# Patient Record
Sex: FEMALE | Race: WHITE | NOT HISPANIC OR LATINO | Employment: OTHER | URBAN - METROPOLITAN AREA
[De-identification: names, ages, dates, MRNs, and addresses within clinical notes are randomized per-mention and may not be internally consistent; named-entity substitution may affect disease eponyms.]

---

## 2017-07-25 ENCOUNTER — OFFICE VISIT (OUTPATIENT)
Dept: FAMILY MEDICINE CLINIC | Facility: CLINIC | Age: 77
End: 2017-07-25

## 2017-07-25 VITALS
HEART RATE: 76 BPM | WEIGHT: 114 LBS | BODY MASS INDEX: 21.52 KG/M2 | SYSTOLIC BLOOD PRESSURE: 120 MMHG | DIASTOLIC BLOOD PRESSURE: 74 MMHG | HEIGHT: 61 IN | OXYGEN SATURATION: 98 %

## 2017-07-25 DIAGNOSIS — E78.5 HYPERLIPIDEMIA, UNSPECIFIED HYPERLIPIDEMIA TYPE: Primary | ICD-10-CM

## 2017-07-25 DIAGNOSIS — R53.83 FATIGUE, UNSPECIFIED TYPE: ICD-10-CM

## 2017-07-25 DIAGNOSIS — F51.01 PRIMARY INSOMNIA: ICD-10-CM

## 2017-07-25 PROCEDURE — 99213 OFFICE O/P EST LOW 20 MIN: CPT | Performed by: INTERNAL MEDICINE

## 2017-07-25 RX ORDER — CYANOCOBALAMIN 1000 UG/ML
1000 INJECTION, SOLUTION INTRAMUSCULAR; SUBCUTANEOUS
Status: DISCONTINUED | OUTPATIENT
Start: 2017-07-25 | End: 2018-05-14

## 2017-07-25 RX ORDER — DOXEPIN HYDROCHLORIDE 6 MG/1
6 TABLET ORAL NIGHTLY
Qty: 30 TABLET | Refills: 6 | Status: SHIPPED | OUTPATIENT
Start: 2017-07-25 | End: 2017-07-28 | Stop reason: SDUPTHER

## 2017-07-25 NOTE — PROGRESS NOTES
Chief Complaint   Patient presents with   • Hyperlipidemia     WANTS LABS DRAWN      Subjective   Shalini Bertrand is a 77 y.o. female    History of Present Illness    Shalini is here earlier than her usual yearly exam.  She has not been taking her cholesterol medication.  She is struggling with inability to fall asleep at night and she is worried about her memory.  His obvious that when she doesn't sleep at night she does have some difficulty remembering.  She is concerned about her fatigue at near 78 years old but sounds to me from her social history she is quite active.    The following portions of the patient's history were reviewed and updated as appropriate: allergies, current medications, past social history and problem list    No Known Allergies   Past Medical History:   Diagnosis Date   • Anemia    • Anxiety    • Arthritis    • Bacterial infection    • Basal cell carcinoma     Right cheek   • Depression    • Hyperlipidemia    • Sinus disorder    • Skin disease    • Sleep disturbance    • Uterus problem     Lining around uterus   • Weight disorder       Past Surgical History:   Procedure Laterality Date   • LASER ABLATION      uterine ablation      Social History     Social History   • Marital status: Single     Spouse name: N/A   • Number of children: N/A   • Years of education: N/A     Occupational History   • Not on file.     Social History Main Topics   • Smoking status: Former Smoker   • Smokeless tobacco: Never Used   • Alcohol use 0.6 oz/week     1 Glasses of wine per week      Comment: 2 TO 3 X A WEEK   • Drug use: No   • Sexual activity: Defer     Other Topics Concern   • Not on file     Social History Narrative      Current Outpatient Prescriptions on File Prior to Visit   Medication Sig Dispense Refill   • FLUoxetine (PROzac) 40 MG capsule Take 40 mg by mouth Daily.     • [DISCONTINUED] amitriptyline (ELAVIL) 25 MG tablet Take 25 mg by mouth Every Night.     • [DISCONTINUED] pravastatin (PRAVACHOL) 40  MG tablet Take 40 mg by mouth Daily.       No current facility-administered medications on file prior to visit.         Review of Systems   Constitutional: Negative for appetite change and fatigue.   HENT: Negative for ear pain and sore throat.    Eyes: Negative for itching and visual disturbance.   Respiratory: Negative for cough and shortness of breath.    Cardiovascular: Negative for chest pain and palpitations.   Gastrointestinal: Negative for abdominal pain and nausea.   Endocrine: Negative for cold intolerance and heat intolerance.   Genitourinary: Negative for dysuria and hematuria.   Musculoskeletal: Negative for arthralgias and back pain.   Skin: Negative for rash and wound.   Allergic/Immunologic: Negative for environmental allergies and food allergies.   Neurological: Negative for dizziness and headaches.   Hematological: Negative for adenopathy. Does not bruise/bleed easily.   Psychiatric/Behavioral: Negative for sleep disturbance. The patient is not nervous/anxious.        Objective     Vitals:    07/25/17 1156   BP: 120/74   Pulse: 76   SpO2: 98%       Physical Exam   Constitutional: She is oriented to person, place, and time. She appears well-developed and well-nourished. No distress.   HENT:   Head: Normocephalic.   No Exopthalmos   Neck: No JVD present. No thyromegaly present.   Cardiovascular: Normal rate, regular rhythm, normal heart sounds and intact distal pulses.    No murmur heard.  Pulmonary/Chest: Effort normal. No respiratory distress. She has no rales. She exhibits no tenderness.   Lymphadenopathy:     She has no cervical adenopathy.   Neurological: She is alert and oriented to person, place, and time.   Skin: Skin is warm and dry. She is not diaphoretic.   Psychiatric: She has a normal mood and affect. Her behavior is normal.   Nursing note and vitals reviewed.      Assessment/Plan   Problem List Items Addressed This Visit     None      Visit Diagnoses     Hyperlipidemia, unspecified  hyperlipidemia type    -  Primary    Relevant Orders    Lipid panel    Primary insomnia        Fatigue, unspecified type        Relevant Medications    cyanocobalamin injection 1,000 mcg        She is requesting a bottle of B12 and she will find someone to inject it for her.  I told her that I do not anticipate a dramatic response.  She also needs to know where her cholesterol as having been off her pravastatin.  She hasn't follow-up physical coming later this fall.  We will try some Tylenol or 6 mg at bedtime for sleep

## 2017-07-27 ENCOUNTER — TELEPHONE (OUTPATIENT)
Dept: FAMILY MEDICINE CLINIC | Facility: CLINIC | Age: 77
End: 2017-07-27

## 2017-07-27 NOTE — TELEPHONE ENCOUNTER
CASH PAYER AND ON RX SILENOR 6 MG PATIENT WILL HAVE TO PAY $191.40, IF CHANGE TO 10 MG WILL BE $11.00. CAN WE CHANGE IT TO THIS DOSAGE. ALSO NEED A RX FOR B12 INJECTION.

## 2017-07-28 DIAGNOSIS — E78.5 HYPERLIPIDEMIA, UNSPECIFIED HYPERLIPIDEMIA TYPE: ICD-10-CM

## 2017-07-28 RX ORDER — DOXEPIN HYDROCHLORIDE 6 MG/1
10 TABLET ORAL NIGHTLY
Qty: 30 TABLET | Refills: 6 | Status: SHIPPED | OUTPATIENT
Start: 2017-07-28 | End: 2017-11-15 | Stop reason: SDUPTHER

## 2017-07-28 RX ORDER — DOXEPIN HYDROCHLORIDE 10 MG/1
10 CAPSULE ORAL NIGHTLY
Qty: 30 CAPSULE | Refills: 1 | Status: SHIPPED | OUTPATIENT
Start: 2017-07-28 | End: 2017-11-15 | Stop reason: SDUPTHER

## 2017-07-31 ENCOUNTER — TELEPHONE (OUTPATIENT)
Dept: FAMILY MEDICINE CLINIC | Facility: CLINIC | Age: 77
End: 2017-07-31

## 2017-07-31 RX ORDER — CYANOCOBALAMIN 1000 UG/ML
1000 INJECTION, SOLUTION INTRAMUSCULAR; SUBCUTANEOUS ONCE
Qty: 1 ML | Refills: 6 | Status: SHIPPED | OUTPATIENT
Start: 2017-07-31 | End: 2017-07-31

## 2017-11-03 ENCOUNTER — TELEPHONE (OUTPATIENT)
Dept: FAMILY MEDICINE CLINIC | Facility: CLINIC | Age: 77
End: 2017-11-03

## 2017-11-03 NOTE — TELEPHONE ENCOUNTER
PT NEEDS A NOTE FOR HER DOG TO FLY WITH HER ON THE PLANE. THEY WILL NOT LET HER ON THE PLANE WITHOUT A NOTE STATING THE DOG IS FOR EMOTIONAL SUPPORT SO SHE CAN FLY. SHE NEEDS THIS TODAY PLEASE. FAX TO WHO IT MAY CONCERN NUMBER [phone].                                                                                                                                                                                                                                                                                                THIS IS THE WRONG [facility] CONTRERASHOWIE PHILLIPS'E RE ENTERED THE CORRECT ONE .

## 2017-11-03 NOTE — TELEPHONE ENCOUNTER
PT NEEDS A NOTE STATING HER DOG HAS TO FLY WITH HER FOR EMOTIONAL SUPPORT.PLEASE FAX TO WHOM CONCERNED  [phone] ASAP  THIS IS THE CORRECT PT.

## 2017-11-14 ENCOUNTER — TELEPHONE (OUTPATIENT)
Dept: FAMILY MEDICINE CLINIC | Facility: CLINIC | Age: 77
End: 2017-11-14

## 2017-11-14 NOTE — TELEPHONE ENCOUNTER
PT WANTS THE BIG VIAL TO LAST FOR A YEAR OF THE CYANOCOBALAMIN, ALSO DOXEPIN 10MG 1PO NIGHTLY AND FLUOXETINE 40MG 1PO QD.[facility] [address]

## 2017-11-15 RX ORDER — DOXEPIN HYDROCHLORIDE 10 MG/1
10 CAPSULE ORAL NIGHTLY
Qty: 30 CAPSULE | Refills: 1 | Status: SHIPPED | OUTPATIENT
Start: 2017-11-15 | End: 2018-04-23 | Stop reason: SDUPTHER

## 2017-11-15 RX ORDER — CYANOCOBALAMIN 1000 UG/ML
1000 INJECTION, SOLUTION INTRAMUSCULAR; SUBCUTANEOUS ONCE
Qty: 30 ML | Refills: 0 | Status: SHIPPED | OUTPATIENT
Start: 2017-11-15 | End: 2017-11-15

## 2017-11-15 RX ORDER — FLUOXETINE HYDROCHLORIDE 40 MG/1
40 CAPSULE ORAL DAILY
Qty: 30 CAPSULE | Refills: 1 | Status: SHIPPED | OUTPATIENT
Start: 2017-11-15 | End: 2018-04-23 | Stop reason: SDUPTHER

## 2017-11-15 NOTE — TELEPHONE ENCOUNTER
E-rx  Cyanocobalamin 1000 mcg  1 cc sq q month  Disp 30ml with 1 refill  Doxepin 10 mg 1 po qd # 30  1 refill  Fluoxetine 40 mg 1 po qd # 30  1 refill   Notified patient we do not do a years supply of meds ,we will need to see her at least twice a year ..

## 2018-04-06 ENCOUNTER — TELEPHONE (OUTPATIENT)
Dept: FAMILY MEDICINE CLINIC | Facility: CLINIC | Age: 78
End: 2018-04-06

## 2018-04-06 NOTE — TELEPHONE ENCOUNTER
PT NEEDS NOTE REGARDING LIGHT WITH DOG SENT TO      [id number]  [facility]  [id number]      PATIENT WOULD LIKE NOTE ON PRESCRIPTION PAD IF POSSIBLE, EXPLAINED TO HER COULD NOT DO THIS AS LUNA WAS NO LONGER HERE AND WHEN SHE CAME BACK TO [place] SHOULD SEE ONE OF OUR PROVIDERS    PTH

## 2018-04-06 NOTE — TELEPHONE ENCOUNTER
Old letter sent.  We have not seen patient since July 2017 and has not filled any meds with us since Nov.  Seems to have a new PCP that should write letter for her.

## 2018-04-23 ENCOUNTER — TELEPHONE (OUTPATIENT)
Dept: FAMILY MEDICINE CLINIC | Facility: CLINIC | Age: 78
End: 2018-04-23

## 2018-04-23 RX ORDER — FLUOXETINE HYDROCHLORIDE 40 MG/1
40 CAPSULE ORAL DAILY
Qty: 22 CAPSULE | Refills: 0 | Status: SHIPPED | OUTPATIENT
Start: 2018-04-23 | End: 2018-05-14

## 2018-04-23 RX ORDER — DOXEPIN HYDROCHLORIDE 10 MG/1
10 CAPSULE ORAL NIGHTLY
Qty: 22 CAPSULE | Refills: 0 | Status: SHIPPED | OUTPATIENT
Start: 2018-04-23 | End: 2018-09-12 | Stop reason: SDUPTHER

## 2018-04-23 NOTE — TELEPHONE ENCOUNTER
FLUOXETINE 40 Mg, TAKES 1 PER DAY, 30 DAY SUPPLY  DOXEPIN 10 Mg, TAKE 1 PER NIGHT OR AS NEEDED 30 DAY SUPPLY    HAS AN APPT ON 5/14    IS COMPLETELY OUT      [facility]

## 2018-04-23 NOTE — TELEPHONE ENCOUNTER
E-Rx Doxepin 10 mg capsules, tale 1 capsule po qd #22, 0 refills & Fluoxetine 40 mg capsules, take 1 capsule po qd #22, 0 refills to [facility] in [facility].

## 2018-05-14 ENCOUNTER — OFFICE VISIT (OUTPATIENT)
Dept: FAMILY MEDICINE CLINIC | Facility: CLINIC | Age: 78
End: 2018-05-14

## 2018-05-14 ENCOUNTER — LAB REQUISITION (OUTPATIENT)
Dept: LAB | Facility: HOSPITAL | Age: 78
End: 2018-05-14

## 2018-05-14 VITALS
HEART RATE: 70 BPM | BODY MASS INDEX: 21.34 KG/M2 | WEIGHT: 113 LBS | SYSTOLIC BLOOD PRESSURE: 124 MMHG | HEIGHT: 61 IN | DIASTOLIC BLOOD PRESSURE: 78 MMHG | OXYGEN SATURATION: 99 %

## 2018-05-14 DIAGNOSIS — G47.9 SLEEP DISTURBANCE: ICD-10-CM

## 2018-05-14 DIAGNOSIS — F41.9 ANXIETY: Primary | ICD-10-CM

## 2018-05-14 DIAGNOSIS — C44.91 BASAL CELL CARCINOMA, UNSPECIFIED SITE: ICD-10-CM

## 2018-05-14 DIAGNOSIS — E53.8 B12 DEFICIENCY: ICD-10-CM

## 2018-05-14 DIAGNOSIS — E78.5 HYPERLIPIDEMIA, UNSPECIFIED HYPERLIPIDEMIA TYPE: ICD-10-CM

## 2018-05-14 DIAGNOSIS — Z00.00 ROUTINE GENERAL MEDICAL EXAMINATION AT A HEALTH CARE FACILITY: ICD-10-CM

## 2018-05-14 DIAGNOSIS — F32.A DEPRESSION, UNSPECIFIED DEPRESSION TYPE: ICD-10-CM

## 2018-05-14 LAB
ALBUMIN SERPL-MCNC: 4.3 G/DL (ref 3.2–4.8)
ALBUMIN/GLOB SERPL: 1.6 G/DL (ref 1.5–2.5)
ALP SERPL-CCNC: 84 U/L (ref 25–100)
ALT SERPL-CCNC: 13 U/L (ref 7–40)
AST SERPL-CCNC: 17 U/L (ref 0–33)
BILIRUB SERPL-MCNC: 0.6 MG/DL (ref 0.3–1.2)
BUN SERPL-MCNC: 17 MG/DL (ref 9–23)
BUN/CREAT SERPL: 18.9 (ref 7–25)
CALCIUM SERPL-MCNC: 9.5 MG/DL (ref 8.7–10.4)
CHLORIDE SERPL-SCNC: 104 MMOL/L (ref 99–109)
CHOLEST SERPL-MCNC: 345 MG/DL (ref 0–200)
CO2 SERPL-SCNC: 29 MMOL/L (ref 20–31)
CREAT SERPL-MCNC: 0.9 MG/DL (ref 0.6–1.3)
GFR SERPLBLD CREATININE-BSD FMLA CKD-EPI: 60 ML/MIN/1.73
GFR SERPLBLD CREATININE-BSD FMLA CKD-EPI: 73 ML/MIN/1.73
GLOBULIN SER CALC-MCNC: 2.7 GM/DL
GLUCOSE SERPL-MCNC: 106 MG/DL (ref 70–100)
HDLC SERPL-MCNC: 64 MG/DL (ref 40–60)
LDLC SERPL CALC-MCNC: 217 MG/DL (ref 0–100)
POTASSIUM SERPL-SCNC: 4.9 MMOL/L (ref 3.5–5.5)
PROT SERPL-MCNC: 7 G/DL (ref 5.7–8.2)
SODIUM SERPL-SCNC: 140 MMOL/L (ref 132–146)
TRIGL SERPL-MCNC: 322 MG/DL (ref 0–150)
VLDLC SERPL CALC-MCNC: 64.4 MG/DL

## 2018-05-14 PROCEDURE — 36415 COLL VENOUS BLD VENIPUNCTURE: CPT | Performed by: INTERNAL MEDICINE

## 2018-05-14 PROCEDURE — 99214 OFFICE O/P EST MOD 30 MIN: CPT | Performed by: INTERNAL MEDICINE

## 2018-05-14 RX ORDER — FLUOXETINE HYDROCHLORIDE 20 MG/1
CAPSULE ORAL
Qty: 90 CAPSULE | Refills: 1 | Status: SHIPPED | OUTPATIENT
Start: 2018-05-14 | End: 2018-09-12 | Stop reason: SDUPTHER

## 2018-05-14 RX ORDER — DOXEPIN HYDROCHLORIDE 6 MG/1
6 TABLET ORAL NIGHTLY PRN
Qty: 90 TABLET | Refills: 1 | Status: SHIPPED | OUTPATIENT
Start: 2018-05-14 | End: 2019-06-18

## 2018-05-14 RX ORDER — CYANOCOBALAMIN 1000 UG/ML
1000 INJECTION, SOLUTION INTRAMUSCULAR; SUBCUTANEOUS
Qty: 10 ML | Refills: 0 | Status: SHIPPED | OUTPATIENT
Start: 2018-05-14 | End: 2018-09-12 | Stop reason: SDUPTHER

## 2018-05-14 NOTE — PATIENT INSTRUCTIONS
Flueoxetine:  Take 40mg (2 capsules) every other day alternate with 20mg (1 capsule) every other day for period of 2wks.  If doing well, then drop down to 20mg (1 capsule) every day/ daily.    Doxepin:   Dropping dose from 10mg to 6mg nightly as needed  Trial melatonin.

## 2018-05-14 NOTE — PROGRESS NOTES
77F here to [facility].  Prior patient of Dr. Torre.      Current issues:   - wants ears checked  -dry skin    -wants to wean doxepin (for sleep) and fluoxetine    -asking for letter for emotional support dog - flying to [place] , stays 4-5mo at a time, daughter with ILD - provided in past by Dr. Torre.    Review of Systems   General: no fatigue, fever/chills, unintentional wt loss, malaise, night sweats  Skin: no rash, no hives, no lesions,   Eyes: no visual disturbance   Heme: no brusing, no bleeding  ENT: no hearing loss, no dizziness, no nosebleed, no hoarseness  Endocrine: , no polyuria, polyphagia, polydipsia, no heat or cold intolerance  GI: no nausea, no vomiting, no diarrhea, no constipation, no bleeding, no pain  : no dysuria, no urinary frequency,, no hematuria, or incontinence  Extremities: no edema, , no claudication  Cardiac: no chest pain, no palpitations, no orthopnea, no PND  Respiratory: no cough, no sputum, no wheezing, no sob , no hemoptysis  Neuro: no headache, no seizure,, no paresthesias or weakness  Psych: stable anxiety, no depression    Patient Active Problem List    Diagnosis   • B12 deficiency [E53.8]   • Sleep disturbance [G47.9]   • Hyperlipidemia [E78.5]   • Anxiety [F41.9]   • Depression [F32.9]   • Basal cell carcinoma [C44.91]     Overview Note:     Right cheek       Past Surgical History:   Procedure Laterality Date   • LASER ABLATION      uterine ablation     Current Outpatient Prescriptions   Medication Sig Dispense Refill   • doxepin (SINEquan) 10 MG capsule Take 1 capsule by mouth Every Night. 22 capsule 0   • FLUoxetine (PROzac) 40 MG capsule Take 1 capsule by mouth Daily. 22 capsule 0   • cyanocobalamin 1000 MCG/ML injection Inject 1 mL into the shoulder, thigh, or buttocks Every 28 (Twenty-Eight) Days. 10 mL 0   • Doxepin HCl 6 MG tablet Take 6 mg by mouth At Night As Needed (insomnia). 90 tablet 1   • FLUoxetine (PROZAC) 20 MG capsule Take 40mg daily alternating with 20mg  "daily for period of 2wks, then take 20mg daily. 90 capsule 1     No current facility-administered medications for this visit.      No Known Allergies    Social History     Social History   • Marital status: Single   • Number of children: 1     Social History Main Topics   • Smoking status: Former Smoker   • Smokeless tobacco: Never Used   • Alcohol use 0.6 oz/week     1 Glasses of wine per week      Comment: 2 TO 3 X A WEEK   • Drug use: No   • Sexual activity: Defer     Other Topics Concern   • Not on file     Social History Narrative    5/18:    Single, living with sig other, going well.    1 daughter, Almaz Guillaume, in [place]    2 gk.    Exercise: yes    Travels to [place]    Dental: utd    Eye : utd     Family History   Problem Relation Age of Onset   • Breast cancer Other    • Heart disease Other    • Hyperlipidemia Other    • Osteoporosis Other    • Diabetes Other    • Cancer Other    • Intracerebral hemorrhage Mother    • Heart attack Father    • Hypertension Father      /78   Pulse 70   Ht 154.9 cm (61\")   Wt 51.3 kg (113 lb)   SpO2 99%   BMI 21.35 kg/m²   Gen: well appearing in nad, no resp effort  Eyes: conjunctiva clear, perrl, eomi  ENT: mmm, no thyromegaly, no lymphadenopathy  CV: s1, s2 reg no m/r/g, no bruits, no jvd  No peripheral edema, pedal pulses intact  Resp:  clear b/l no w/r/r  GI:  soft nt/nd  Skin: no clubbing or cyanosis  Neuro: no focal deficits.    A/P    1. Anxiety    2. Hyperlipidemia, unspecified hyperlipidemia type    3. B12 deficiency    4. Basal cell carcinoma, unspecified site    5. Depression, unspecified depression type    6. Sleep disturbance      Chronic issues stable, b12 shot given today, ordered  Labs ordered  F/u derm - pt to schedule  Wean doxepin from 10mg to 6mg nightly as needed sleep - discussed benefits/ risks of this med - see if this dose helps, consider weaning further  Wean fluoxetine from 40mg to 20mg as written in avs, see if stable on lower " dose    F/u 6mo medicare wellness or as needed

## 2018-07-20 ENCOUNTER — TELEPHONE (OUTPATIENT)
Dept: FAMILY MEDICINE CLINIC | Facility: CLINIC | Age: 78
End: 2018-07-20

## 2018-07-20 RX ORDER — NITROFURANTOIN 25; 75 MG/1; MG/1
100 CAPSULE ORAL EVERY 12 HOURS SCHEDULED
Qty: 10 CAPSULE | Refills: 0 | Status: SHIPPED | OUTPATIENT
Start: 2018-07-20 | End: 2019-06-18

## 2018-07-20 NOTE — TELEPHONE ENCOUNTER
PER PT IS IN [place], STATED WHEN SHE URINATES SHE HAS A TINGLING AND CHILL FEELING. PT IS WONDERING IF SOMETHING CAN GET CALLED INTO THE PHARMACY TILL THE TIME SHE WILL BE BACK. PT WILL BE BACK NEXT WEEK. PLEASE CALL PT BACK TO ADVISE.

## 2018-07-20 NOTE — TELEPHONE ENCOUNTER
Spoke with patient.  Treated about a month ago with a shot, steroid, amoxicillin - took x 10d.  C/o urinary sx since then.  Frequency, sometimes some tingling.  A little chill.  Last couple of weeks.  No fever, urgency, hematuria.    [phone].  [facility] pharmacy, [address], [place]    Plan will be to push fluids and see if sx improve over 24-48hrs.  Will send over macrobid 100m gbid x 5d in case needed while away.  Advised UC evaluation of worsening sx, lack of repsonse to tx.  Pt agrees with plan

## 2018-09-11 ENCOUNTER — TELEPHONE (OUTPATIENT)
Dept: FAMILY MEDICINE CLINIC | Facility: CLINIC | Age: 78
End: 2018-09-11

## 2018-09-11 NOTE — TELEPHONE ENCOUNTER
PER PT WAS IN THE HOSPITAL THE END OF JUNE AND THEY PRESCRIBED SOME OF THESE MEDICATIONS,  PT WOULD LIKE A REFILL.  ZOCOR 40 MG      DOXEPIN 10 MG PRN 90 DAY SUPPLY     ASPRIN 81 MG 1 EVERY DAY 90 DAY SUPPLY    B12 INJECTION    [facility] [address]

## 2018-09-12 RX ORDER — FLUOXETINE HYDROCHLORIDE 20 MG/1
20 CAPSULE ORAL DAILY
Qty: 90 CAPSULE | Refills: 0 | Status: SHIPPED | OUTPATIENT
Start: 2018-09-12 | End: 2019-01-03 | Stop reason: SDUPTHER

## 2018-09-12 RX ORDER — DOXEPIN HYDROCHLORIDE 10 MG/1
10 CAPSULE ORAL NIGHTLY
Qty: 90 CAPSULE | Refills: 0 | Status: SHIPPED | OUTPATIENT
Start: 2018-09-12 | End: 2019-06-18 | Stop reason: SDUPTHER

## 2018-09-12 RX ORDER — CYANOCOBALAMIN 1000 UG/ML
1000 INJECTION, SOLUTION INTRAMUSCULAR; SUBCUTANEOUS
Qty: 10 ML | Refills: 0 | Status: SHIPPED | OUTPATIENT
Start: 2018-09-12 | End: 2019-10-02 | Stop reason: SDUPTHER

## 2018-09-12 RX ORDER — SIMVASTATIN 40 MG
40 TABLET ORAL NIGHTLY
Qty: 90 TABLET | Refills: 0 | Status: SHIPPED | OUTPATIENT
Start: 2018-09-12 | End: 2019-01-03 | Stop reason: SDUPTHER

## 2018-09-12 NOTE — TELEPHONE ENCOUNTER
Spoke to patient, she was admitted to [facility] in [place] for nausea, vomiting and dizziness. She was there for 24 hour observation.     Records requested.     [facility] #[phone].     She verified the doses of requested meds. She also requested Fluoxetine 20mg.

## 2018-09-12 NOTE — TELEPHONE ENCOUNTER
Of course. Just wanted you to be aware of the hospitalization, addition of Simvastatin and increase of Doxepin back to 10mg. She was previously instructed to wean from 10mg to 6mg.     Each escribed, 90 day with 0RF. She has an appt 11/5/18.

## 2019-01-02 ENCOUNTER — TELEPHONE (OUTPATIENT)
Dept: FAMILY MEDICINE CLINIC | Facility: CLINIC | Age: 79
End: 2019-01-02

## 2019-01-02 NOTE — TELEPHONE ENCOUNTER
SIMVASTATIN 40 MG 1 IN THE EVENING 90 DAY SUPPLY    FLUOXETINE 20 MG 1 A DAY 90 DAY SUPPLY      [facility]

## 2019-01-03 RX ORDER — SIMVASTATIN 40 MG
40 TABLET ORAL NIGHTLY
Qty: 90 TABLET | Refills: 0 | Status: SHIPPED | OUTPATIENT
Start: 2019-01-03 | End: 2019-03-27 | Stop reason: SDUPTHER

## 2019-01-03 RX ORDER — FLUOXETINE HYDROCHLORIDE 20 MG/1
20 CAPSULE ORAL DAILY
Qty: 90 CAPSULE | Refills: 0 | Status: SHIPPED | OUTPATIENT
Start: 2019-01-03 | End: 2019-03-27 | Stop reason: SDUPTHER

## 2019-03-28 RX ORDER — SIMVASTATIN 40 MG
TABLET ORAL
Qty: 90 TABLET | Refills: 0 | Status: SHIPPED | OUTPATIENT
Start: 2019-03-28 | End: 2019-06-18

## 2019-03-28 RX ORDER — FLUOXETINE HYDROCHLORIDE 20 MG/1
CAPSULE ORAL
Qty: 90 CAPSULE | Refills: 0 | Status: SHIPPED | OUTPATIENT
Start: 2019-03-28 | End: 2019-06-18 | Stop reason: SDUPTHER

## 2019-05-14 ENCOUNTER — TELEPHONE (OUTPATIENT)
Dept: FAMILY MEDICINE CLINIC | Facility: CLINIC | Age: 79
End: 2019-05-14

## 2019-05-14 NOTE — TELEPHONE ENCOUNTER
Received letter requesting emotional support psychiatric service animal.  I need to see her in the office before I can complete the paperwork.

## 2019-05-15 NOTE — TELEPHONE ENCOUNTER
PT JUST CALLED BACK AND STATED THAT SHE IS IN [place] AND SHE FLIES BACK TO [place] ON 05/25/19. SHE NEEDS THE LETTER TO GET HER DOG ON THE PLANE. SHE SAID THAT SHE ALREADY HAS A  LETTER ON FILE FROM LAST YEAR BUT THE AIRPORT REQUIRES A FORM TO BE FILLED OUT THAT SHE SAID SHE FAXED HERE A DAY OR TWO AGO.

## 2019-05-16 NOTE — TELEPHONE ENCOUNTER
I spoke to the patient regarding her request for a statement to bring her dog on the airplane. She wants a letter similar to the last letter done by Dr Torre.

## 2019-06-18 ENCOUNTER — OFFICE VISIT (OUTPATIENT)
Dept: FAMILY MEDICINE CLINIC | Facility: CLINIC | Age: 79
End: 2019-06-18

## 2019-06-18 ENCOUNTER — TELEPHONE (OUTPATIENT)
Dept: FAMILY MEDICINE CLINIC | Facility: CLINIC | Age: 79
End: 2019-06-18

## 2019-06-18 ENCOUNTER — LAB REQUISITION (OUTPATIENT)
Dept: LAB | Facility: HOSPITAL | Age: 79
End: 2019-06-18

## 2019-06-18 VITALS
BODY MASS INDEX: 19.81 KG/M2 | WEIGHT: 116 LBS | SYSTOLIC BLOOD PRESSURE: 124 MMHG | OXYGEN SATURATION: 96 % | HEIGHT: 64 IN | HEART RATE: 72 BPM | DIASTOLIC BLOOD PRESSURE: 78 MMHG

## 2019-06-18 DIAGNOSIS — M54.2 NECK PAIN: ICD-10-CM

## 2019-06-18 DIAGNOSIS — Z00.00 ROUTINE GENERAL MEDICAL EXAMINATION AT A HEALTH CARE FACILITY: ICD-10-CM

## 2019-06-18 DIAGNOSIS — F41.1 GAD (GENERALIZED ANXIETY DISORDER): ICD-10-CM

## 2019-06-18 DIAGNOSIS — N18.30 CKD (CHRONIC KIDNEY DISEASE) STAGE 3, GFR 30-59 ML/MIN (HCC): ICD-10-CM

## 2019-06-18 DIAGNOSIS — F32.5 MAJOR DEPRESSION IN COMPLETE REMISSION (HCC): ICD-10-CM

## 2019-06-18 DIAGNOSIS — R73.01 ELEVATED FASTING GLUCOSE: ICD-10-CM

## 2019-06-18 DIAGNOSIS — G47.00 INSOMNIA, UNSPECIFIED TYPE: ICD-10-CM

## 2019-06-18 DIAGNOSIS — L29.9 ITCHING: ICD-10-CM

## 2019-06-18 DIAGNOSIS — E78.2 MIXED HYPERLIPIDEMIA: Primary | ICD-10-CM

## 2019-06-18 PROCEDURE — 36415 COLL VENOUS BLD VENIPUNCTURE: CPT | Performed by: FAMILY MEDICINE

## 2019-06-18 PROCEDURE — 99214 OFFICE O/P EST MOD 30 MIN: CPT | Performed by: FAMILY MEDICINE

## 2019-06-18 RX ORDER — DOXEPIN HYDROCHLORIDE 10 MG/1
10 CAPSULE ORAL NIGHTLY PRN
Qty: 90 CAPSULE | Refills: 3 | Status: SHIPPED | OUTPATIENT
Start: 2019-06-18 | End: 2021-08-17

## 2019-06-18 RX ORDER — ATORVASTATIN CALCIUM 40 MG/1
40 TABLET, FILM COATED ORAL NIGHTLY
Qty: 90 TABLET | Refills: 3 | Status: SHIPPED | OUTPATIENT
Start: 2019-06-18 | End: 2020-10-16 | Stop reason: SDUPTHER

## 2019-06-18 RX ORDER — FLUOXETINE HYDROCHLORIDE 20 MG/1
20 CAPSULE ORAL DAILY
Qty: 90 CAPSULE | Refills: 3 | Status: SHIPPED | OUTPATIENT
Start: 2019-06-18 | End: 2020-10-16 | Stop reason: SDUPTHER

## 2019-06-18 RX ORDER — BACLOFEN 10 MG/1
5-10 TABLET ORAL NIGHTLY PRN
Qty: 14 TABLET | Refills: 0 | Status: SHIPPED | OUTPATIENT
Start: 2019-06-18 | End: 2020-05-05 | Stop reason: SDUPTHER

## 2019-06-18 NOTE — TELEPHONE ENCOUNTER
Patient forgot to mention during her appointment that her thumb locks up and is painful and she would like a referral to a hand specialist. Please call back at [phone]

## 2019-06-18 NOTE — TELEPHONE ENCOUNTER
I recommend physical therapy for her hand.  She interested in a referral?  I do not think she needs to see a hand surgeon.

## 2019-06-18 NOTE — PROGRESS NOTES
Chief Complaint   Patient presents with   • Hyperlipidemia      Transferring care from another physician    Hyperlipidemia   This is a chronic problem. Current antihyperlipidemic treatment includes statins.   Anxiety   Presents for initial visit. Symptoms include nervous/anxious behavior. Patient reports no depressed mood.     Past treatments include non-SSRI antidepressants and SSRIs.    BHASKAR-7  Over the last two weeks, how often have you been bothered by the following problems?  Feeling nervous, anxious or on edge: 1  Not being able to stop or control worryin  Worrying too much about different things: 0  Trouble Relaxin  Being so restless that it is hard to sit still: 0  Becoming easily annoyed or irritable: 1  Feeling afraid as if something awful might happen: 1  BHASKAR 7 Total Score: 4  If you checked any problems, how difficult have these problems made it for you to do your work, take care of things at home, or get along with other people: Not difficult at all      PHQ-2/PHQ-9 Depression Screening 2019   Little interest or pleasure in doing things 0   Feeling down, depressed, or hopeless 0   Total Score 0       Taking cholesterol medication for many years. No known side effects. No known heart problems or heart attack. Last 2018 might have had a mini stroke, arguing with partner over canceled flight and she got upset, ended up in hospital with mini stroke. Blood sugar was over 400, but she wasn't diabetic but was related to IVF. When she take zocor at night, scratchy at night like crazy.  She has seen dermatology in the past and they recommended Cetaphil for her itching.    Doxepin for insomnia. Intermittent use. Sometimes groggy the next morning, takes 1/2 to 1 pil if she goes several nights without sleeping. Previous medication included amitriptyline.     Taking prozac for years, tapered down from 80mg, to 40mg to now 20mg.     Neck and shoulder pain. Wants to try a muscle relaxer. Slight  headache after her fall.     Review of Systems   Musculoskeletal: Positive for neck pain and neck stiffness.   Skin:        itching   Neurological: Positive for headache.   Psychiatric/Behavioral: Positive for sleep disturbance. Negative for depressed mood. The patient is nervous/anxious.       СВЕТЛАНА Fall Risk Clinician Key Questions   Have you fallen in the past year?: Yes    Stay Idependant Patient Questions   Patient Fall Risk Assessment Score : 0        Current Outpatient Medications on File Prior to Visit   Medication Sig Dispense Refill   • cyanocobalamin 1000 MCG/ML injection Inject 1 mL into the appropriate muscle as directed by prescriber Every 28 (Twenty-Eight) Days. 10 mL 0   • [DISCONTINUED] doxepin (SINEquan) 10 MG capsule Take 1 capsule by mouth Every Night. 90 capsule 0   • [DISCONTINUED] FLUoxetine (PROzac) 20 MG capsule TAKE 1 CAPSULE BY MOUTH ONCE DAILY 90 capsule 0   • [DISCONTINUED] simvastatin (ZOCOR) 40 MG tablet TAKE 1 TABLET BY MOUTH ONCE DAILY AT NIGHT 90 tablet 0   • [DISCONTINUED] aspirin 81 MG tablet Take 1 tablet by mouth Daily. 90 tablet 0   • [DISCONTINUED] Doxepin HCl 6 MG tablet Take 6 mg by mouth At Night As Needed (insomnia). 90 tablet 1   • [DISCONTINUED] nitrofurantoin, macrocrystal-monohydrate, (MACROBID) 100 MG capsule Take 1 capsule by mouth Every 12 (Twelve) Hours. 10 capsule 0     No current facility-administered medications on file prior to visit.        No Known Allergies    Past Medical History:   Diagnosis Date   • Anemia    • Anxiety    • Arthritis    • Bacterial infection    • Basal cell carcinoma     Right cheek   • Depression    • Hyperlipidemia    • Sinus disorder    • Sleep disturbance    • TIA (transient ischemic attack) 2019   • Uterus problem     Lining around uterus   • Weight disorder         Past Surgical History:   Procedure Laterality Date   • LASER ABLATION      uterine ablation        Family History   Problem Relation Age of Onset   • Heart disease Other  "   • Hyperlipidemia Other    • Osteoporosis Other    • Diabetes Other    • Intracerebral hemorrhage Mother    • Heart attack Father    • Hypertension Father    • Heart attack Brother    • Breast cancer Daughter         Social History     Socioeconomic History   • Marital status: Single     Spouse name: Not on file   • Number of children: 1   • Years of education: Not on file   • Highest education level: Not on file   Tobacco Use   • Smoking status: Former Smoker   • Smokeless tobacco: Never Used   Substance and Sexual Activity   • Alcohol use: Yes     Alcohol/week: 0.6 oz     Types: 1 Glasses of wine per week     Comment: 2 TO 3 X A WEEK   • Drug use: No   • Sexual activity: Defer   Social History Narrative    5/18:    Single, living with sig other, going well.    1 daughter, Almaz Guillaume, in [place]    2 gk.    Exercise: yes    [place]    Dental: utd    Eye : [facility]        Visit Vitals  /78 (BP Location: Left arm, Patient Position: Sitting, Cuff Size: Adult)   Pulse 72   Ht 162.6 cm (64\")   Wt 52.6 kg (116 lb)   SpO2 96%   BMI 19.91 kg/m²        Physical Exam   Constitutional: No distress.   Cardiovascular: Normal rate and regular rhythm.   No murmur heard.  Pulmonary/Chest: Effort normal and breath sounds normal.   Musculoskeletal:        Right shoulder: She exhibits decreased range of motion ( Pain increased with lateral rotation left). She exhibits no bony tenderness.   Psychiatric: She has a normal mood and affect. Her behavior is normal. Judgment and thought content normal.   Vitals reviewed.      Results for orders placed or performed in visit on 05/14/18   Lipid panel   Result Value Ref Range    Total Cholesterol 345 (H) 0 - 200 mg/dL    Triglycerides 322 (H) 0 - 150 mg/dL    HDL Cholesterol 64 (H) 40 - 60 mg/dL    VLDL Cholesterol 64.4 mg/dL    LDL Cholesterol  217 (H) 0 - 100 mg/dL   Comprehensive metabolic panel   Result Value Ref Range    Glucose 106 (H) 70 - 100 mg/dL    BUN 17 9 " - 23 mg/dL    Creatinine 0.90 0.60 - 1.30 mg/dL    eGFR Non African Am 60 (L) >60 mL/min/1.73    eGFR African Am 73 >60 mL/min/1.73    BUN/Creatinine Ratio 18.9 7.0 - 25.0    Sodium 140 132 - 146 mmol/L    Potassium 4.9 3.5 - 5.5 mmol/L    Chloride 104 99 - 109 mmol/L    Total CO2 29.0 20.0 - 31.0 mmol/L    Calcium 9.5 8.7 - 10.4 mg/dL    Total Protein 7.0 5.7 - 8.2 g/dL    Albumin 4.30 3.20 - 4.80 g/dL    Globulin 2.7 gm/dL    A/G Ratio 1.6 1.5 - 2.5 g/dL    Total Bilirubin 0.6 0.3 - 1.2 mg/dL    Alkaline Phosphatase 84 25 - 100 U/L    AST (SGOT) 17 0 - 33 U/L    ALT (SGPT) 13 7 - 40 U/L        Shalini was seen today for hyperlipidemia.    Diagnoses and all orders for this visit:    Mixed hyperlipidemia  -     atorvastatin (LIPITOR) 40 MG tablet; Take 1 tablet by mouth Every Night.  -     Comprehensive Metabolic Panel; Future  -     Lipid Panel; Future  -     Lipid Panel  -     Comprehensive Metabolic Panel  Reviewed most recent lab results with severely elevated mixed hyperlipidemia.  Additionally patient reports an outside hospital she was admitted for mini stroke.  Recommend continuing statin therapy.  She is concerned of side effect of itching with Zocor.  Since she has had inadequately controlled lipids as well as potential side effect recommend switching to high intensity statin with atorvastatin 40 mg.   BHASKAR (generalized anxiety disorder)  -     FLUoxetine (PROzac) 20 MG capsule; Take 1 capsule by mouth Daily.  Stable.  She has done well with lower dose of Prozac 20 mg.  Patient also travels with her dog and emotional support animal.  Reprinted letter from May 2019 for her to have for her personal records.  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  Stable.  She has done well with lower dose of Prozac 20 mg.  Insomnia, unspecified type  -     doxepin (SINEquan) 10 MG capsule; Take 1 capsule by mouth At Night As Needed for  Sleep.  Patient reports intermittent doxepin use without side effects.  Neck pain  -     baclofen (LIORESAL) 10 MG tablet; Take 0.5-1 tablets by mouth At Night As Needed for Muscle Spasms.  New.  Caution patient with side effects of sedation with muscle relaxers and to try half a tablet as needed.  Short-term use only.  CKD (chronic kidney disease) stage 3, GFR 30-59 ml/min (CMS/[facility])  -     Comprehensive Metabolic Panel; Future  -     Comprehensive Metabolic Panel  Reviewed previous lab results showing moderately reduced kidney function.  Recheck today.  Elevated fasting glucose  -     Comprehensive Metabolic Panel; Future  -     Comprehensive Metabolic Panel  Patient had elevated sugar with her last labs a year ago.  Recheck today.  Itching  -     Ambulatory Referral to Dermatology  Patient request second opinion from new dermatologist.      Return in about 1 year (around 6/18/2020) for Medicare Wellness.

## 2019-06-19 LAB
ALBUMIN SERPL-MCNC: 4.2 G/DL (ref 3.5–5.2)
ALBUMIN/GLOB SERPL: 1.7 G/DL
ALP SERPL-CCNC: 83 U/L (ref 39–117)
ALT SERPL-CCNC: 8 U/L (ref 1–33)
AST SERPL-CCNC: 11 U/L (ref 1–32)
BILIRUB SERPL-MCNC: 0.6 MG/DL (ref 0.2–1.2)
BUN SERPL-MCNC: 13 MG/DL (ref 8–23)
BUN/CREAT SERPL: 16.9 (ref 7–25)
CALCIUM SERPL-MCNC: 9.3 MG/DL (ref 8.6–10.5)
CHLORIDE SERPL-SCNC: 104 MMOL/L (ref 98–107)
CHOLEST SERPL-MCNC: 219 MG/DL (ref 0–200)
CO2 SERPL-SCNC: 28.7 MMOL/L (ref 22–29)
CREAT SERPL-MCNC: 0.77 MG/DL (ref 0.57–1)
GLOBULIN SER CALC-MCNC: 2.5 GM/DL
GLUCOSE SERPL-MCNC: 109 MG/DL (ref 65–99)
HDLC SERPL-MCNC: 70 MG/DL (ref 40–60)
LDLC SERPL CALC-MCNC: 119 MG/DL (ref 0–100)
POTASSIUM SERPL-SCNC: 4.7 MMOL/L (ref 3.5–5.2)
PROT SERPL-MCNC: 6.7 G/DL (ref 6–8.5)
SODIUM SERPL-SCNC: 143 MMOL/L (ref 136–145)
TRIGL SERPL-MCNC: 151 MG/DL (ref 0–150)
VLDLC SERPL CALC-MCNC: 30.2 MG/DL

## 2019-06-19 NOTE — TELEPHONE ENCOUNTER
Pt called back. I provided her with the medical advice listed below. She also asked about her labs, she verbalized understanding and agreed

## 2019-07-30 ENCOUNTER — OFFICE VISIT (OUTPATIENT)
Dept: FAMILY MEDICINE CLINIC | Facility: CLINIC | Age: 79
End: 2019-07-30

## 2019-07-30 ENCOUNTER — HOSPITAL ENCOUNTER (OUTPATIENT)
Dept: GENERAL RADIOLOGY | Facility: HOSPITAL | Age: 79
Discharge: HOME | End: 2019-07-30
Admitting: FAMILY MEDICINE

## 2019-07-30 VITALS
HEART RATE: 91 BPM | SYSTOLIC BLOOD PRESSURE: 126 MMHG | OXYGEN SATURATION: 95 % | WEIGHT: 116.2 LBS | HEIGHT: 64 IN | BODY MASS INDEX: 19.84 KG/M2 | DIASTOLIC BLOOD PRESSURE: 74 MMHG

## 2019-07-30 DIAGNOSIS — L29.9 PRURITUS: ICD-10-CM

## 2019-07-30 DIAGNOSIS — L29.9 PRURITUS: Primary | ICD-10-CM

## 2019-07-30 PROCEDURE — 99212 OFFICE O/P EST SF 10 MIN: CPT | Performed by: FAMILY MEDICINE

## 2019-07-30 PROCEDURE — 71046 X-RAY EXAM CHEST 2 VIEWS: CPT

## 2019-07-30 NOTE — PROGRESS NOTES
Chief Complaint   Patient presents with   • Imaging Only     pt comes in today needing an order for a chest xray, states that dermatology gave her an order but she doesn't remember what she did with it.         HPI   Itching:  Saw dermatology for itching and was recommended to have CXR. Prescribed allegra 60mg twice a day, it has helped. She had been eating more ice cream, years ago was allergic to milk. Stopped eating ice cream.             Review of Systems   Respiratory: Negative for shortness of breath and wheezing.    Skin:        itching        Current Outpatient Medications on File Prior to Visit   Medication Sig Dispense Refill   • atorvastatin (LIPITOR) 40 MG tablet Take 1 tablet by mouth Every Night. 90 tablet 3   • baclofen (LIORESAL) 10 MG tablet Take 0.5-1 tablets by mouth At Night As Needed for Muscle Spasms. 14 tablet 0   • cyanocobalamin 1000 MCG/ML injection Inject 1 mL into the appropriate muscle as directed by prescriber Every 28 (Twenty-Eight) Days. 10 mL 0   • doxepin (SINEquan) 10 MG capsule Take 1 capsule by mouth At Night As Needed for Sleep. 90 capsule 3   • FLUoxetine (PROzac) 20 MG capsule Take 1 capsule by mouth Daily. 90 capsule 3     No current facility-administered medications on file prior to visit.        No Known Allergies    Past Medical History:   Diagnosis Date   • Anemia    • Anxiety    • Arthritis    • Bacterial infection    • Basal cell carcinoma     Right cheek   • Depression    • Hyperlipidemia    • Sinus disorder    • Sleep disturbance    • TIA (transient ischemic attack) 2019   • Uterus problem     Lining around uterus   • Weight disorder         Past Surgical History:   Procedure Laterality Date   • LASER ABLATION      uterine ablation        Family History   Problem Relation Age of Onset   • Heart disease Other    • Hyperlipidemia Other    • Osteoporosis Other    • Diabetes Other    • Intracerebral hemorrhage Mother    • Heart attack Father    • Hypertension Father    •  "Heart attack Brother    • Breast cancer Daughter         Social History     Socioeconomic History   • Marital status: Single     Spouse name: Not on file   • Number of children: 1   • Years of education: Not on file   • Highest education level: Not on file   Tobacco Use   • Smoking status: Former Smoker   • Smokeless tobacco: Never Used   Substance and Sexual Activity   • Alcohol use: Yes     Alcohol/week: 0.6 oz     Types: 1 Glasses of wine per week     Comment: 2 TO 3 X A WEEK   • Drug use: No   • Sexual activity: Defer   Social History Narrative    5/18:    Single, living with sig other, going well.    1 daughter, Almaz Guillaume, in [place]    2 gk.    Exercise: yes    Travels to [place]    Dental: utd    Eye : utd        Visit Vitals  /74 (BP Location: Left arm, Patient Position: Sitting, Cuff Size: Adult)   Pulse 91   Ht 162.6 cm (64\")   Wt 52.7 kg (116 lb 3.2 oz)   SpO2 95%   BMI 19.95 kg/m²        Physical Exam   Constitutional: No distress.   Cardiovascular: Normal rate and regular rhythm.   No murmur heard.  Pulmonary/Chest: Effort normal and breath sounds normal.   Skin: Rash noted.   Excoriations posterior neck   Psychiatric: She has a normal mood and affect.   Vitals reviewed.           Shalini was seen today for imaging only.    Diagnoses and all orders for this visit:    Pruritus  -     XR Chest PA & Lateral; Future    Reviewed dermatology records from 7/9/2019.  She was recommended to have chest x-ray and was ordered today.  Of note she is not having any respiratory complaints.  Continue Allegra as prescribed by dermatologist.    Return for Medicare Wellness.  "

## 2019-10-02 RX ORDER — CYANOCOBALAMIN 1000 UG/ML
1000 INJECTION, SOLUTION INTRAMUSCULAR; SUBCUTANEOUS
Qty: 10 ML | Refills: 1 | Status: SHIPPED | OUTPATIENT
Start: 2019-10-02 | End: 2020-10-16 | Stop reason: SDUPTHER

## 2019-10-02 NOTE — TELEPHONE ENCOUNTER
PT REQUESTING REFILL ON B12 INJECTION, LOOKS LIKE DR ROSADO WAS THE LAST PHYSICIAN TO SEND IN REFILLS AND I DIDN'T SEE THAT WE HAVE CHECKED HER B12 LATELY.     IS THIS OKAY TO SEND IN?

## 2020-05-04 DIAGNOSIS — M54.2 NECK PAIN: ICD-10-CM

## 2020-05-04 NOTE — TELEPHONE ENCOUNTER
PATIENT REQUESTING A PRESCRIPTION FOR A MUSCLE RELAXER    SEND TO:  [address] - [address] [facility] [phone] [facility] [phone] [facility]      .

## 2020-05-05 ENCOUNTER — TELEPHONE (OUTPATIENT)
Dept: FAMILY MEDICINE CLINIC | Facility: CLINIC | Age: 80
End: 2020-05-05

## 2020-05-05 DIAGNOSIS — M54.2 NECK PAIN: ICD-10-CM

## 2020-05-05 RX ORDER — BACLOFEN 10 MG/1
5-10 TABLET ORAL NIGHTLY PRN
Qty: 14 TABLET | Refills: 0 | OUTPATIENT
Start: 2020-05-05

## 2020-05-05 RX ORDER — BACLOFEN 10 MG/1
5-10 TABLET ORAL NIGHTLY PRN
Qty: 14 TABLET | Refills: 0 | Status: SHIPPED | OUTPATIENT
Start: 2020-05-05 | End: 2020-10-16 | Stop reason: SDUPTHER

## 2020-05-05 NOTE — TELEPHONE ENCOUNTER
Triamcinolone 0.1% is the strongest cream I recommend. I am not comfortable prescribing a muscle relaxer due to her age.

## 2020-05-05 NOTE — TELEPHONE ENCOUNTER
Contacted patient and advised her the Baclofen has been sent to her pharmacy but Dr. FRYE does not feel comfortable prescribing anything stronger than the triamcinolone. She stated that she does not need this at this time.

## 2020-05-05 NOTE — TELEPHONE ENCOUNTER
Pt is stuck in [place] due to the lockdown. She is unable to seek medical attention there. She states that she would like a refill for a muscle relaxer that  prescribed previously.     She also stated that she can't get in touch with her dermatologist to speak with them about her Prurigo Nodularis.    A week ago she stopped taking her ATORVASTATIN, she is thinking that it may be causing her itching, however the medication she was prescribed by her dermatologist is not working. She is using   TRIAMCINOLONE ACETONIDE CREAM 0.1%

## 2020-05-05 NOTE — TELEPHONE ENCOUNTER
Contacted the patient she stated that has been using organic baby lotion to help manage her prurigo nodarlaris and it is not working. She was previously being prescribed Triamcinolone creame by her dermatologist. Their office is now closed and she is requesting Dr. FRYE to call in an alternative to help with her itching    She is unable to complete a virtual visit because she is currently in [place]. Is there an alternative med she can recieve to help with her itching nodules?

## 2020-07-15 ENCOUNTER — TELEPHONE (OUTPATIENT)
Dept: FAMILY MEDICINE CLINIC | Facility: CLINIC | Age: 80
End: 2020-07-15

## 2020-08-26 ENCOUNTER — TELEPHONE (OUTPATIENT)
Dept: FAMILY MEDICINE CLINIC | Facility: CLINIC | Age: 80
End: 2020-08-26

## 2020-08-26 NOTE — TELEPHONE ENCOUNTER
Patient is calling stating that she has a nasal drip that is just very constant that is causing her to cough. Patient has been tested for covid-19 and it was negative. Patient has been taking allergy medicine and it not working. Please advise    [phone]

## 2020-09-11 DIAGNOSIS — F32.5 MAJOR DEPRESSION IN COMPLETE REMISSION (HCC): ICD-10-CM

## 2020-09-11 DIAGNOSIS — G47.00 INSOMNIA, UNSPECIFIED TYPE: ICD-10-CM

## 2020-09-11 DIAGNOSIS — F41.1 GAD (GENERALIZED ANXIETY DISORDER): ICD-10-CM

## 2020-09-14 RX ORDER — FLUOXETINE HYDROCHLORIDE 20 MG/1
CAPSULE ORAL
Qty: 7 CAPSULE | Refills: 0 | OUTPATIENT
Start: 2020-09-14

## 2020-09-14 RX ORDER — DOXEPIN HYDROCHLORIDE 10 MG/1
10 CAPSULE ORAL NIGHTLY PRN
Qty: 7 CAPSULE | Refills: 0 | OUTPATIENT
Start: 2020-09-14

## 2020-10-12 ENCOUNTER — TELEPHONE (OUTPATIENT)
Dept: FAMILY MEDICINE CLINIC | Facility: CLINIC | Age: 80
End: 2020-10-12

## 2020-10-12 DIAGNOSIS — E78.2 MIXED HYPERLIPIDEMIA: ICD-10-CM

## 2020-10-12 DIAGNOSIS — M54.2 NECK PAIN: ICD-10-CM

## 2020-10-12 DIAGNOSIS — F41.1 GAD (GENERALIZED ANXIETY DISORDER): ICD-10-CM

## 2020-10-12 DIAGNOSIS — F32.5 MAJOR DEPRESSION IN COMPLETE REMISSION (HCC): ICD-10-CM

## 2020-10-12 RX ORDER — ATORVASTATIN CALCIUM 40 MG/1
40 TABLET, FILM COATED ORAL NIGHTLY
Qty: 90 TABLET | Refills: 3 | Status: CANCELLED | OUTPATIENT
Start: 2020-10-12

## 2020-10-12 RX ORDER — FLUOXETINE HYDROCHLORIDE 20 MG/1
20 CAPSULE ORAL DAILY
Qty: 90 CAPSULE | Refills: 3 | Status: CANCELLED | OUTPATIENT
Start: 2020-10-12

## 2020-10-12 RX ORDER — BACLOFEN 10 MG/1
5-10 TABLET ORAL NIGHTLY PRN
Qty: 14 TABLET | Refills: 0 | Status: CANCELLED | OUTPATIENT
Start: 2020-10-12

## 2020-10-12 RX ORDER — CYANOCOBALAMIN 1000 UG/ML
1000 INJECTION, SOLUTION INTRAMUSCULAR; SUBCUTANEOUS
Qty: 10 ML | Refills: 1 | Status: CANCELLED | OUTPATIENT
Start: 2020-10-12

## 2020-10-12 NOTE — TELEPHONE ENCOUNTER
Caller: Shalini Bertrand    Relationship: Self    Best call back number: [phone]    Medication needed:   Requested Prescriptions     Pending Prescriptions Disp Refills   • FLUoxetine (PROzac) 20 MG capsule 90 capsule 3     Sig: Take 1 capsule by mouth Daily.   • atorvastatin (LIPITOR) 40 MG tablet 90 tablet 3     Sig: Take 1 tablet by mouth Every Night.   • baclofen (LIORESAL) 10 MG tablet 14 tablet 0     Sig: Take 0.5-1 tablets by mouth At Night As Needed for Muscle Spasms.   • cyanocobalamin 1000 MCG/ML injection 10 mL 1     Sig: Inject 1 mL into the appropriate muscle as directed by prescriber Every 28 (Twenty-Eight) Days.       When do you need the refill by: ASAP    What details did the patient provide when requesting the medication: HAS BEEN IN [place] SINCE COVID STARTED AND WON'T BE ABLE TO COME IN FOR APPOINTMENT. PLEASE ADVISE    Does the patient have less than a 3 day supply:  [x] Yes  [] No    What is the patient's preferred pharmacy: [address] - [address] [facility] [phone] [facility] [phone]

## 2020-10-13 NOTE — TELEPHONE ENCOUNTER
Attempted to contact patient, no answer. LVM to return call to schedule an upcoming appt     Hub can relay and schedule

## 2020-10-16 ENCOUNTER — TELEMEDICINE (OUTPATIENT)
Dept: FAMILY MEDICINE CLINIC | Facility: CLINIC | Age: 80
End: 2020-10-16

## 2020-10-16 DIAGNOSIS — G47.00 INSOMNIA, UNSPECIFIED TYPE: ICD-10-CM

## 2020-10-16 DIAGNOSIS — F41.1 GAD (GENERALIZED ANXIETY DISORDER): ICD-10-CM

## 2020-10-16 DIAGNOSIS — L29.9 PRURITUS: ICD-10-CM

## 2020-10-16 DIAGNOSIS — E53.8 B12 DEFICIENCY: ICD-10-CM

## 2020-10-16 DIAGNOSIS — M54.2 NECK PAIN: ICD-10-CM

## 2020-10-16 DIAGNOSIS — E78.2 MIXED HYPERLIPIDEMIA: Primary | ICD-10-CM

## 2020-10-16 DIAGNOSIS — F32.5 MAJOR DEPRESSION IN COMPLETE REMISSION (HCC): ICD-10-CM

## 2020-10-16 PROCEDURE — 99442 PR PHYS/QHP TELEPHONE EVALUATION 11-20 MIN: CPT | Performed by: FAMILY MEDICINE

## 2020-10-16 RX ORDER — ATORVASTATIN CALCIUM 40 MG/1
40 TABLET, FILM COATED ORAL NIGHTLY
Qty: 90 TABLET | Refills: 3 | Status: SHIPPED | OUTPATIENT
Start: 2020-10-16 | End: 2021-08-17

## 2020-10-16 RX ORDER — FLUOXETINE HYDROCHLORIDE 20 MG/1
20 CAPSULE ORAL DAILY
Qty: 90 CAPSULE | Refills: 3 | Status: SHIPPED | OUTPATIENT
Start: 2020-10-16 | End: 2021-11-03 | Stop reason: SDUPTHER

## 2020-10-16 RX ORDER — BACLOFEN 10 MG/1
10 TABLET ORAL NIGHTLY PRN
Qty: 30 TABLET | Refills: 3 | Status: SHIPPED | OUTPATIENT
Start: 2020-10-16 | End: 2021-08-18 | Stop reason: SDUPTHER

## 2020-10-16 RX ORDER — DOXEPIN HYDROCHLORIDE 10 MG/1
10 CAPSULE ORAL NIGHTLY PRN
Qty: 90 CAPSULE | Refills: 3 | Status: CANCELLED | OUTPATIENT
Start: 2020-10-16

## 2020-10-16 RX ORDER — CYANOCOBALAMIN 1000 UG/ML
1000 INJECTION, SOLUTION INTRAMUSCULAR; SUBCUTANEOUS
Qty: 10 ML | Refills: 11 | Status: SHIPPED | OUTPATIENT
Start: 2020-10-16 | End: 2021-08-17

## 2020-10-16 NOTE — PROGRESS NOTES
Telehealth telephone visit.   You have chosen to receive care through a telephone visit. Do you consent to use a telephone visit for your medical care today? Yes      Chief Complaint   Patient presents with   • Hyperlipidemia        HPI     Tries to eat a healthy diet. Stopped taking atorvastatin for a couple weeks. She decided to restart medication. She was having skin and allergy problems and concerned it was the cause. Mood is pretty good with fluoxetine. Sleep is not good. Started taking melatonin. She used to take doxepin but didn't want to get in the habit of taking it. Baclofen helps neck pain as needed when tensing up. Gives herself B12 injections. She exercises, plays golf, walks and rides a bike. Biggest problem is her skin. She has been treated with steroids but has ongoing problem. She was told to take zyrtec in morning and benadryl at night as well ointments. She is under the care of dermatology.     Advance Care Planning   ACP discussion was held with the patient during this visit. Patient has an advance directive (not in EMR), copy requested.      Review of Systems   Musculoskeletal: Negative for neck pain.   Skin:        itching   Psychiatric/Behavioral: Positive for sleep disturbance. Negative for depressed mood. The patient is not nervous/anxious.         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 90 tablet 3   • baclofen (LIORESAL) 10 MG tablet Take 0.5-1 tablets by mouth At Night As Needed for Muscle Spasms. 14 tablet 0   • cyanocobalamin 1000 MCG/ML injection Inject 1 mL into the appropriate muscle as directed by prescriber Every 28  (Twenty-Eight) Days. 10 mL 1   • doxepin (SINEquan) 10 MG capsule Take 1 capsule by mouth At Night As Needed for Sleep. 90 capsule 3   • FLUoxetine (PROzac) 20 MG capsule Take 1 capsule by mouth Daily. 90 capsule 3     No current facility-administered medications for this visit.        No Known Allergies    Past Medical History:   Diagnosis Date   • Anemia    • Anxiety    • Arthritis    • Bacterial infection    • Basal cell carcinoma     Right cheek   • Depression    • Hyperlipidemia    • Sinus disorder    • Sleep disturbance    • TIA (transient ischemic attack) 2019   • Uterus problem     Lining around uterus   • Weight disorder         Past Surgical History:   Procedure Laterality Date   • LASER ABLATION      uterine ablation   • NOSE SURGERY          Family History   Problem Relation Age of Onset   • Heart disease Other    • Hyperlipidemia Other    • Osteoporosis Other    • Diabetes Other    • Intracerebral hemorrhage Mother    • Heart attack Father    • Hypertension Father    • Heart attack Brother    • Breast cancer Daughter         Social History     Socioeconomic History   • Marital status: Single     Spouse name: Not on file   • Number of children: 1   • Years of education: Not on file   • Highest education level: Not on file   Tobacco Use   • Smoking status: Former Smoker   • Smokeless tobacco: Never Used   Substance and Sexual Activity   • Alcohol use: Yes     Alcohol/week: 1.0 standard drinks     Types: 1 Glasses of wine per week     Comment: 2 TO 3 X A WEEK   • Drug use: No   • Sexual activity: Defer   Social History Narrative    5/18:    Single, living with sig other, going well.    1 daughter, Almaz Guillaume, in [place]    2 gk.    Exercise: yes    [place]    Dental: utd    Eye : utd        There were no vitals filed for this visit.   There is no height or weight on file to calculate BMI.    Physical Exam  HENT:      Right Ear: Hearing normal.      Left Ear: Hearing normal.    Pulmonary:      Effort: No respiratory distress.   Neurological:      Mental Status: She is alert and oriented to person, place, and time.   Psychiatric:         Mood and Affect: Mood normal.         Behavior: Behavior is cooperative.         Thought Content: Thought content normal.         Judgment: Judgment normal.         Diagnoses and all orders for this visit:    1. Mixed hyperlipidemia (Primary)  -     atorvastatin (LIPITOR) 40 MG tablet; Take 1 tablet by mouth Every Night.  Dispense: 90 tablet; Refill: 3  Continue statin.  2. BHASKAR (generalized anxiety disorder)  -     FLUoxetine (PROzac) 20 MG capsule; Take 1 capsule by mouth Daily.  Dispense: 90 capsule; Refill: 3  Continue Prozac.    3. 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  Dispense: 90 capsule; Refill: 3  Continue Prozac.  4. Neck pain  -     baclofen (LIORESAL) 10 MG tablet; Take 1 tablet by mouth At Night As Needed for Muscle Spasms.  Dispense: 30 tablet; Refill: 3  Tolerating baclofen intermittent use without side effects.  5. Insomnia, unspecified type  Patient has been trying to wean off doxepin.  She has replaced it with melatonin.  She did not need a refill of doxepin at this time.  6. B12 deficiency  -     cyanocobalamin 1000 MCG/ML injection; Inject 1 mL into the appropriate muscle as directed by prescriber Every 28 (Twenty-Eight) Days.  Dispense: 10 mL; Refill: 11  Continue home B12 injection.  Other orders  -     Cancel: doxepin (SINEquan) 10 MG capsule; Take 1 capsule by mouth At Night As Needed for Sleep.  Dispense: 90 capsule; Refill: 3        Return in about 6 months (around 4/16/2021) for Medicare Wellness.    Start of visit 1:57 pm. End of visit 2:10 pm.    Dr. Henrietta Vasquez

## 2021-08-17 ENCOUNTER — OFFICE VISIT (OUTPATIENT)
Dept: FAMILY MEDICINE CLINIC | Facility: CLINIC | Age: 81
End: 2021-08-17

## 2021-08-17 VITALS
HEART RATE: 88 BPM | DIASTOLIC BLOOD PRESSURE: 84 MMHG | SYSTOLIC BLOOD PRESSURE: 120 MMHG | HEIGHT: 64 IN | OXYGEN SATURATION: 97 % | BODY MASS INDEX: 18.88 KG/M2 | WEIGHT: 110.6 LBS

## 2021-08-17 DIAGNOSIS — R42 DIZZINESS: ICD-10-CM

## 2021-08-17 DIAGNOSIS — R06.02 SHORTNESS OF BREATH: Primary | ICD-10-CM

## 2021-08-17 DIAGNOSIS — G47.9 SLEEP DISTURBANCE: ICD-10-CM

## 2021-08-17 PROCEDURE — 99214 OFFICE O/P EST MOD 30 MIN: CPT | Performed by: FAMILY MEDICINE

## 2021-08-17 RX ORDER — TRAZODONE HYDROCHLORIDE 50 MG/1
25-50 TABLET ORAL NIGHTLY
Qty: 90 TABLET | Refills: 3 | Status: SHIPPED | OUTPATIENT
Start: 2021-08-17 | End: 2022-11-16 | Stop reason: SDUPTHER

## 2021-08-17 NOTE — PROGRESS NOTES
"Chief Complaint  Dizziness (Pt states that she has been dizzy for several months. Pt states that sometimes when she turns her neck she gets dizzy. Pt states that she has quit taking her Liptor because she thought that was causing the dizziness. ), Shortness of Breath (Pt states that she has noticed that she has been more short of breath than normal. Pt states that she sometimes whenever she takes a deep breath in she has some pain in the middle of her back. ), and Insomnia (Pt states that she has been able to sleep for a while now. )    Subjective          Shalini Bertrand presents to [facility]  History of Present Illness       She noticed she has shortness of breath sometimes after playing golf walking. Able to walk in her home. Balance is off if its her ears or something else and would like wax checked. She has a lot of mercury in her teeth from fillings as a teenager and wonders if that causes dizziness. She has had a few head injuries. She has a little cough. Middle back pain when she takes a deep breath. She doesn't sleep but denies fatigue. No falls. Leaning over or getting up to walk across the room she is dizzy. She used to be ballet dancer. She had labs checked in [place]. She has tried several medications for insomnia melatonin, Tylenol PM, doxepin.     Review of Systems   Constitutional: Negative for fatigue.   Respiratory: Positive for cough and shortness of breath.    Cardiovascular: Negative for chest pain.   Musculoskeletal: Positive for back pain.   Neurological: Positive for dizziness.   Psychiatric/Behavioral: Positive for sleep disturbance.         Objective   Vital Signs:   /84   Pulse 88   Ht 162.6 cm (64.02\")   Wt 50.2 kg (110 lb 9.6 oz)   SpO2 97%   BMI 18.97 kg/m²     Physical Exam  Vitals reviewed.   Constitutional:       General: She is not in acute distress.     Appearance: She is not ill-appearing.   HENT:      Right Ear: Tympanic membrane normal.    "   Left Ear: Tympanic membrane normal.   Cardiovascular:      Rate and Rhythm: Normal rate and regular rhythm.   Pulmonary:      Effort: Pulmonary effort is normal.      Breath sounds: Normal breath sounds.   Neurological:      Mental Status: She is alert.      Comments: 3 passes in exam room and on final gait mild imbalance without falling   Psychiatric:         Mood and Affect: Mood normal.        Result Review :                 Assessment and Plan    Diagnoses and all orders for this visit:    1. Shortness of breath (Primary)  -     CBC (No Diff); Future  -     Comprehensive metabolic panel; Future  -     XR Chest PA & Lateral; Future    2. Dizziness  -     CBC (No Diff); Future  -     Comprehensive metabolic panel; Future  -     CT Head Without Contrast; Future    3. Sleep disturbance  -     traZODone (DESYREL) 50 MG tablet; Take 0.5-1 tablets by mouth Every Night.  Dispense: 90 tablet; Refill: 3        Further evaluation with labs and chest x-ray.  CT scan of the head.  Consider ENT referral for dizziness.  For sleep will start trazodone and cautioned on side effects.      Follow Up   Return if symptoms worsen or fail to improve.  Patient was given instructions and counseling regarding her condition or for health maintenance advice. Please see specific information pulled into the AVS if appropriate.     Electronically signed by Henrietta Vasquez MD, 08/18/21, 8:48 AM EDT.

## 2021-08-18 ENCOUNTER — HOSPITAL ENCOUNTER (OUTPATIENT)
Dept: GENERAL RADIOLOGY | Facility: HOSPITAL | Age: 81
Discharge: HOME | End: 2021-08-18
Admitting: FAMILY MEDICINE

## 2021-08-18 DIAGNOSIS — M54.2 NECK PAIN: ICD-10-CM

## 2021-08-18 DIAGNOSIS — R06.02 SHORTNESS OF BREATH: ICD-10-CM

## 2021-08-18 PROCEDURE — 71046 X-RAY EXAM CHEST 2 VIEWS: CPT

## 2021-08-18 RX ORDER — BACLOFEN 10 MG/1
10 TABLET ORAL NIGHTLY PRN
Qty: 30 TABLET | Refills: 1 | Status: SHIPPED | OUTPATIENT
Start: 2021-08-18 | End: 2022-06-23

## 2021-08-18 NOTE — TELEPHONE ENCOUNTER
Rx Refill Note  Requested Prescriptions     Pending Prescriptions Disp Refills   • baclofen (LIORESAL) 10 MG tablet 30 tablet 3     Sig: Take 1 tablet by mouth At Night As Needed for Muscle Spasms.      Last office visit with prescribing clinician: 8/17/2021      Next office visit with prescribing clinician: Visit date not found            Kriss Gregory MA  08/18/21, 13:17 EDT

## 2021-08-18 NOTE — TELEPHONE ENCOUNTER
Caller: Shalini Bertrand    Relationship: Self    Best call back number: [phone]    Medication needed:   Requested Prescriptions     Pending Prescriptions Disp Refills   • baclofen (LIORESAL) 10 MG tablet 30 tablet 3     Sig: Take 1 tablet by mouth At Night As Needed for Muscle Spasms.       When do you need the refill by: 08/18    What additional details did the patient provide when requesting the medication: PATIENT IS OUT OF THE MEDICATION     Does the patient have less than a 3 day supply:  [x] Yes  [] No    What is the patient's preferred pharmacy: [facility] [address] [place] [address] [phone] [facility] [phone]

## 2021-08-23 ENCOUNTER — TELEPHONE (OUTPATIENT)
Dept: FAMILY MEDICINE CLINIC | Facility: CLINIC | Age: 81
End: 2021-08-23

## 2021-08-23 DIAGNOSIS — R42 VERTIGO: Primary | ICD-10-CM

## 2021-08-23 NOTE — TELEPHONE ENCOUNTER
Called and spoke with pt. Informed her referral for Home Health has been placed and will receive a call to schedule an appointment. Pt verbalized understanding and has no further questions at this time.

## 2021-08-23 NOTE — TELEPHONE ENCOUNTER
Caller: Shalini Bertrand    Relationship: Self    Best call back number: [phone] (H)    What orders are you requesting (i.e. lab or imaging): HOME HEALTH FOR VERTIGO-     In what timeframe would the patient need to come in: ASAP     Where will you receive your lab/imaging services: IN HOME     Additional notes: PATIENT STATED SHE CALLED HER INSURANCE AND THEY STATED IF THE PROVIDER ORDERED HOME HEALTH FOR VERTIGO THEY WOULD PAY FOR IT.

## 2021-08-31 ENCOUNTER — TELEPHONE (OUTPATIENT)
Dept: FAMILY MEDICINE CLINIC | Facility: CLINIC | Age: 81
End: 2021-08-31

## 2021-08-31 NOTE — TELEPHONE ENCOUNTER
BRE WITH [facility] CALLED TO MAKE SURE THAT EDS WOULD BE FOLLOWING UP WITH THE PATIENTS HOME HEALTH.

## 2021-09-03 ENCOUNTER — TELEPHONE (OUTPATIENT)
Dept: FAMILY MEDICINE CLINIC | Facility: CLINIC | Age: 81
End: 2021-09-03

## 2021-09-03 NOTE — TELEPHONE ENCOUNTER
Sandie Damon, physical therapist with [facility] , called to get verbal order to continue service with PT.  She can be reached at [phone]

## 2021-09-07 NOTE — TELEPHONE ENCOUNTER
Contacted Sandie with  & informed her that PCP has approved physical therapy orders. Sandie verbalized understanding and did not have any additional questions at this time.

## 2021-09-12 ENCOUNTER — HOSPITAL ENCOUNTER (OUTPATIENT)
Dept: CT IMAGING | Facility: HOSPITAL | Age: 81
Discharge: HOME | End: 2021-09-12
Admitting: FAMILY MEDICINE

## 2021-09-12 DIAGNOSIS — R42 DIZZINESS: ICD-10-CM

## 2021-09-12 PROCEDURE — 70450 CT HEAD/BRAIN W/O DYE: CPT

## 2021-09-15 ENCOUNTER — TELEPHONE (OUTPATIENT)
Dept: FAMILY MEDICINE CLINIC | Facility: CLINIC | Age: 81
End: 2021-09-15

## 2021-09-15 NOTE — TELEPHONE ENCOUNTER
Left a voicemail informing the patient that we were trying to get some past medical records from Dr.Steven Haq. Patient filled out ALEXIS at last office visit,but when I faxed the ALEXIS over their office sent it back stating that there were no applicable records for the dates that were listed for [id number]. I informed the patient that it might be best that she try to reach out to them about getting the correct dates for her medical records and then we can fax over another ALEXIS. Advised the patient to call back if she needs further information or needs any assistance.   HUB MAY RELAY MESSAGE.

## 2021-09-17 ENCOUNTER — TELEPHONE (OUTPATIENT)
Dept: FAMILY MEDICINE CLINIC | Facility: CLINIC | Age: 81
End: 2021-09-17

## 2021-09-17 NOTE — TELEPHONE ENCOUNTER
Called PT she states she wants to wait a couple of days to see if it gets better before she decides.

## 2021-09-17 NOTE — TELEPHONE ENCOUNTER
Contacted patient and relayed results. She is still having dizziness and is concerned about the lining around her brain may be thinning because she gets easily startled and dizzy,     I advised her that we will follow up her as needed,

## 2021-09-17 NOTE — TELEPHONE ENCOUNTER
Caller: Shalini Bertrand    Relationship: Self    Best call back number: [phone]    Caller requesting test results: YES    What test was performed: CT SCAN    When was the test performed: 9/12/21

## 2021-09-17 NOTE — TELEPHONE ENCOUNTER
Next step in evaluation of dizziness is referral to neurology and/or ENT.  Which referral which she like to be seen first?

## 2021-11-03 DIAGNOSIS — F32.5 MAJOR DEPRESSION IN COMPLETE REMISSION (HCC): ICD-10-CM

## 2021-11-03 DIAGNOSIS — F41.1 GAD (GENERALIZED ANXIETY DISORDER): ICD-10-CM

## 2021-11-03 RX ORDER — FLUOXETINE HYDROCHLORIDE 20 MG/1
20 CAPSULE ORAL DAILY
Qty: 90 CAPSULE | Refills: 3 | Status: SHIPPED | OUTPATIENT
Start: 2021-11-03 | End: 2022-06-23

## 2021-11-03 NOTE — TELEPHONE ENCOUNTER
Rx Refill Note  Requested Prescriptions     Pending Prescriptions Disp Refills   • FLUoxetine (PROzac) 20 MG capsule 90 capsule 3     Sig: Take 1 capsule by mouth Daily.      Last office visit with prescribing clinician: 8/17/2021      Next office visit with prescribing clinician: Visit date not found            NIECY CRAIG MA  11/03/21, 10:50 EDT

## 2022-06-23 ENCOUNTER — LAB (OUTPATIENT)
Dept: LAB | Facility: HOSPITAL | Age: 82
End: 2022-06-23

## 2022-06-23 ENCOUNTER — OFFICE VISIT (OUTPATIENT)
Dept: FAMILY MEDICINE CLINIC | Facility: CLINIC | Age: 82
End: 2022-06-23

## 2022-06-23 VITALS
OXYGEN SATURATION: 98 % | HEART RATE: 87 BPM | RESPIRATION RATE: 18 BRPM | BODY MASS INDEX: 20.06 KG/M2 | SYSTOLIC BLOOD PRESSURE: 122 MMHG | TEMPERATURE: 98.2 F | DIASTOLIC BLOOD PRESSURE: 72 MMHG | HEIGHT: 62 IN | WEIGHT: 109 LBS

## 2022-06-23 DIAGNOSIS — R93.3 ABNORMAL FINDINGS ON DIAGNOSTIC IMAGING OF OTHER PARTS OF DIGESTIVE TRACT: ICD-10-CM

## 2022-06-23 DIAGNOSIS — R79.9 ABNORMAL FINDING OF BLOOD CHEMISTRY, UNSPECIFIED: ICD-10-CM

## 2022-06-23 DIAGNOSIS — R26.89 BALANCE DISORDER: ICD-10-CM

## 2022-06-23 DIAGNOSIS — R93.89 ABNORMAL FINDINGS ON DIAGNOSTIC IMAGING OF OTHER SPECIFIED BODY STRUCTURES: ICD-10-CM

## 2022-06-23 DIAGNOSIS — M89.9 DISORDER OF BONE, UNSPECIFIED: ICD-10-CM

## 2022-06-23 DIAGNOSIS — R42 DIZZINESS: ICD-10-CM

## 2022-06-23 DIAGNOSIS — R26.89 BALANCE PROBLEM: ICD-10-CM

## 2022-06-23 DIAGNOSIS — R06.02 SHORTNESS OF BREATH: ICD-10-CM

## 2022-06-23 DIAGNOSIS — L29.9 ITCHING: Primary | ICD-10-CM

## 2022-06-23 LAB
ALBUMIN SERPL-MCNC: 4.3 G/DL (ref 3.5–5.2)
ALBUMIN/GLOB SERPL: 1.4 G/DL
ALP SERPL-CCNC: 83 U/L (ref 39–117)
ALT SERPL W P-5'-P-CCNC: 10 U/L (ref 1–33)
ANION GAP SERPL CALCULATED.3IONS-SCNC: 11 MMOL/L (ref 5–15)
AST SERPL-CCNC: 16 U/L (ref 1–32)
BASOPHILS # BLD AUTO: 0.06 10*3/MM3 (ref 0–0.2)
BASOPHILS NFR BLD AUTO: 1 % (ref 0–1.5)
BILIRUB SERPL-MCNC: 0.7 MG/DL (ref 0–1.2)
BUN SERPL-MCNC: 12 MG/DL (ref 8–23)
BUN/CREAT SERPL: 12.2 (ref 7–25)
CALCIUM SPEC-SCNC: 9.7 MG/DL (ref 8.6–10.5)
CHLORIDE SERPL-SCNC: 104 MMOL/L (ref 98–107)
CHOLEST SERPL-MCNC: 344 MG/DL (ref 0–200)
CO2 SERPL-SCNC: 25 MMOL/L (ref 22–29)
CREAT SERPL-MCNC: 0.98 MG/DL (ref 0.57–1)
DEPRECATED RDW RBC AUTO: 40.8 FL (ref 37–54)
EGFRCR SERPLBLD CKD-EPI 2021: 57.4 ML/MIN/1.73
EOSINOPHIL # BLD AUTO: 0.12 10*3/MM3 (ref 0–0.4)
EOSINOPHIL NFR BLD AUTO: 2.1 % (ref 0.3–6.2)
ERYTHROCYTE [DISTWIDTH] IN BLOOD BY AUTOMATED COUNT: 12.3 % (ref 12.3–15.4)
GLOBULIN UR ELPH-MCNC: 3.1 GM/DL
GLUCOSE SERPL-MCNC: 94 MG/DL (ref 65–99)
HCT VFR BLD AUTO: 45.9 % (ref 34–46.6)
HDLC SERPL-MCNC: 77 MG/DL (ref 40–60)
HGB BLD-MCNC: 15.1 G/DL (ref 12–15.9)
IMM GRANULOCYTES # BLD AUTO: 0.01 10*3/MM3 (ref 0–0.05)
IMM GRANULOCYTES NFR BLD AUTO: 0.2 % (ref 0–0.5)
LDLC SERPL CALC-MCNC: 238 MG/DL (ref 0–100)
LDLC/HDLC SERPL: 3.06 {RATIO}
LYMPHOCYTES # BLD AUTO: 1.66 10*3/MM3 (ref 0.7–3.1)
LYMPHOCYTES NFR BLD AUTO: 28.4 % (ref 19.6–45.3)
MCH RBC QN AUTO: 29.7 PG (ref 26.6–33)
MCHC RBC AUTO-ENTMCNC: 32.9 G/DL (ref 31.5–35.7)
MCV RBC AUTO: 90.4 FL (ref 79–97)
MONOCYTES # BLD AUTO: 0.44 10*3/MM3 (ref 0.1–0.9)
MONOCYTES NFR BLD AUTO: 7.5 % (ref 5–12)
NEUTROPHILS NFR BLD AUTO: 3.56 10*3/MM3 (ref 1.7–7)
NEUTROPHILS NFR BLD AUTO: 60.8 % (ref 42.7–76)
NRBC BLD AUTO-RTO: 0 /100 WBC (ref 0–0.2)
PLATELET # BLD AUTO: 350 10*3/MM3 (ref 140–450)
PMV BLD AUTO: 10.3 FL (ref 6–12)
POTASSIUM SERPL-SCNC: 4.5 MMOL/L (ref 3.5–5.2)
PROT SERPL-MCNC: 7.4 G/DL (ref 6–8.5)
RBC # BLD AUTO: 5.08 10*6/MM3 (ref 3.77–5.28)
SODIUM SERPL-SCNC: 140 MMOL/L (ref 136–145)
TRIGL SERPL-MCNC: 155 MG/DL (ref 0–150)
TSH SERPL DL<=0.05 MIU/L-ACNC: 2.37 UIU/ML (ref 0.27–4.2)
VLDLC SERPL-MCNC: 29 MG/DL (ref 5–40)
WBC NRBC COR # BLD: 5.85 10*3/MM3 (ref 3.4–10.8)

## 2022-06-23 PROCEDURE — 84443 ASSAY THYROID STIM HORMONE: CPT

## 2022-06-23 PROCEDURE — 99214 OFFICE O/P EST MOD 30 MIN: CPT | Performed by: STUDENT IN AN ORGANIZED HEALTH CARE EDUCATION/TRAINING PROGRAM

## 2022-06-23 PROCEDURE — 80061 LIPID PANEL: CPT

## 2022-06-23 PROCEDURE — 36415 COLL VENOUS BLD VENIPUNCTURE: CPT

## 2022-06-23 PROCEDURE — 83036 HEMOGLOBIN GLYCOSYLATED A1C: CPT

## 2022-06-23 PROCEDURE — 82607 VITAMIN B-12: CPT

## 2022-06-23 PROCEDURE — 82306 VITAMIN D 25 HYDROXY: CPT

## 2022-06-23 PROCEDURE — 80053 COMPREHEN METABOLIC PANEL: CPT

## 2022-06-23 PROCEDURE — 85025 COMPLETE CBC W/AUTO DIFF WBC: CPT

## 2022-06-23 RX ORDER — HYDROXYZINE PAMOATE 25 MG/1
25 CAPSULE ORAL 3 TIMES DAILY PRN
Qty: 30 CAPSULE | Refills: 2 | Status: SHIPPED | OUTPATIENT
Start: 2022-06-23

## 2022-06-23 NOTE — PROGRESS NOTES
New Patient Office Visit      Patient Name: Shalini Bertrand  : 1940   MRN: [id number]     Chief Complaint:  Annual Exam (Est care) and Fall (10 days ago)    History of Present Illness:       fall  Says she tripped on something in the garage. No residual pain.   Before this she has had issues with being off balance. She declines a feeling that the room is spinning.   No syncope or presyncope. She used to do ballet and says for over a year now she cannot stand on one foot like she used to. She says often she will lean to one side when walking. She had a ct of her head which was normal. No palpitations or chest pain shortness of breath or fever. She has a history of b12 deficiency has not been on supplement.       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   Dermatologist is [facility] but she needs a new referral. Has a history of basal cell as well.     She is taking trazodone occasionally for sleep. No side effects.     Subjective        Past Medical History:   Diagnosis Date   • Anemia    • Anxiety    • Arthritis    • Bacterial infection    • Basal cell carcinoma     Right cheek   • Depression    • Hyperlipidemia    • Sinus disorder    • Sleep disturbance    • TIA (transient ischemic attack) 2019   • Uterus problem     Lining around uterus   • Weight disorder        Past Surgical History:   Procedure Laterality Date   • LASER ABLATION      uterine ablation   • NOSE SURGERY         Family History   Problem Relation Age of Onset   • Heart disease Other    • Hyperlipidemia Other    • Osteoporosis Other    • Diabetes Other    • Intracerebral hemorrhage Mother    • Heart attack Father    • Hypertension Father    • Heart attack Brother    • Breast cancer Daughter        Social History     Socioeconomic History   • Marital status: Single   • Number of children: 1   Tobacco Use   •  "Smoking status: Former Smoker     Packs/day: 0.25     Years: 15.00     Pack years: 3.75     Start date:      Quit date:      Years since quittin.4   • Smokeless tobacco: Never Used   Substance and Sexual Activity   • Alcohol use: Yes     Alcohol/week: 1.0 standard drink     Types: 1 Glasses of wine per week     Comment: 2 TO 3 X A WEEK   • Drug use: No   • Sexual activity: Defer          Current Outpatient Medications:   •  traZODone (DESYREL) 50 MG tablet, Take 0.5-1 tablets by mouth Every Night., Disp: 90 tablet, Rfl: 3  •  hydrOXYzine pamoate (Vistaril) 25 MG capsule, Take 1 capsule by mouth 3 (Three) Times a Day As Needed for Itching or Anxiety., Disp: 30 capsule, Rfl: 2    No Known Allergies    Objective     Physical Exam:  Vitals:    22 1321   BP: 122/72   Pulse: 87   Resp: 18   Temp: 98.2 °F (36.8 °C)   SpO2: 98%   Weight: 49.4 kg (109 lb)   Height: 157.5 cm (62\")      Body mass index is 19.94 kg/m².     Physical Exam  Constitutional:       General: She is not in acute distress.     Appearance: Normal appearance.   HENT:      Head: Normocephalic and atraumatic.   Eyes:      Extraocular Movements: Extraocular movements intact.      Comments: No nystagmus   Cardiovascular:      Rate and Rhythm: Normal rate and regular rhythm.      Heart sounds: No murmur heard.  Pulmonary:      Effort: Pulmonary effort is normal. No respiratory distress.      Breath sounds: Normal breath sounds.   Abdominal:      General: Abdomen is flat.   Musculoskeletal:         General: No swelling.      Cervical back: Normal range of motion.      Comments: Normal muscle strength and sensations upper and lower extremities   Skin:     Findings: No rash.   Neurological:      General: No focal deficit present.      Mental Status: She is alert and oriented to person, place, and time.      Cranial Nerves: No cranial nerve deficit.      Sensory: No sensory deficit.      Motor: No weakness.      Coordination: Coordination " normal.      Gait: Gait normal.      Comments: Normal finger to nose  Has imbalance on heel to toe testing.   Psychiatric:         Mood and Affect: Mood normal.              Assessment / Plan      Assessment/Plan:   Diagnoses and all orders for this visit:    1. Itching (Primary)  -     Ambulatory Referral to Dermatology    2. Balance disorder  -     CBC & Differential; Future  -     CBC (No Diff); Future  -     Lipid Panel; Future  -     Hemoglobin A1c; Future  -     TSH Rfx On Abnormal To Free T4; Future  -     Vitamin B12; Future  -     Vitamin D 25 Hydroxy; Future  -     MRI Brain Without Contrast; Future    3. Abnormal findings on diagnostic imaging of other parts of digestive tract   -     Lipid Panel; Future    4. Abnormal finding of blood chemistry, unspecified   -     Hemoglobin A1c; Future    5. Abnormal findings on diagnostic imaging of other specified body structures   -     TSH Rfx On Abnormal To Free T4; Future    6. Disorder of bone, unspecified   -     Vitamin D 25 Hydroxy; Future    7. Balance problem  -     Ambulatory Referral to Physical Therapy Evaluate and treat    Other orders  -     hydrOXYzine pamoate (Vistaril) 25 MG capsule; Take 1 capsule by mouth 3 (Three) Times a Day As Needed for Itching or Anxiety.  Dispense: 30 capsule; Refill: 2        Give vistaril for anxiety about itching. Advised it can cause drowsiness or dizziness, do not take during the day.     Refer to pt for balance issue. Will check mri of her head given history of tia and balance disorder. Ct head looked okay done last fall. Off balance feeling began before this. Check lipids. Check b12.   Neuro exam normal today aside from abnormal heel to toe testing.      Return in about 1 month (around 7/23/2022) for Medicare Wellness.       Lay Funk D.O.  [facility]

## 2022-06-24 LAB
25(OH)D3 SERPL-MCNC: 45 NG/ML (ref 30–100)
HBA1C MFR BLD: 5.9 % (ref 4.8–5.6)
VIT B12 BLD-MCNC: 428 PG/ML (ref 211–946)

## 2022-06-24 RX ORDER — ATORVASTATIN CALCIUM 20 MG/1
20 TABLET, FILM COATED ORAL DAILY
Qty: 90 TABLET | Refills: 0 | Status: SHIPPED | OUTPATIENT
Start: 2022-06-24

## 2022-06-29 ENCOUNTER — TELEPHONE (OUTPATIENT)
Dept: FAMILY MEDICINE CLINIC | Facility: CLINIC | Age: 82
End: 2022-06-29

## 2022-06-29 NOTE — TELEPHONE ENCOUNTER
Informed patient of results and doctor's recommendations. She stated she would probably not take the Lipitor as suggested.

## 2022-06-29 NOTE — TELEPHONE ENCOUNTER
Caller: Shalini Bertrand    Relationship: Self    Best call back number: [phone]    What is the best time to reach you: ANYTIME    Who are you requesting to speak with (clinical staff, provider,  specific staff member): CLINICAL STAFF    Do you know the name of the person who called: SELF    What was the call regarding: PATIENT STATES THAT SHE WOULD LIKE A CALL ABOUT HER RECENT LAB WORK.    Do you require a callback: YES

## 2022-06-30 ENCOUNTER — TELEPHONE (OUTPATIENT)
Dept: FAMILY MEDICINE CLINIC | Facility: CLINIC | Age: 82
End: 2022-06-30

## 2022-07-01 ENCOUNTER — HOSPITAL ENCOUNTER (OUTPATIENT)
Dept: MRI IMAGING | Facility: HOSPITAL | Age: 82
End: 2022-07-01

## 2022-07-05 DIAGNOSIS — R26.89 IMBALANCE: Primary | ICD-10-CM

## 2022-11-01 ENCOUNTER — TELEPHONE (OUTPATIENT)
Dept: FAMILY MEDICINE CLINIC | Facility: CLINIC | Age: 82
End: 2022-11-01

## 2022-11-01 NOTE — TELEPHONE ENCOUNTER
Attempted to contact patient, no answer. Left detailed message to notify her to contact our office to schedule new patient appointment with desired PCP    Hub can relay and schedule NP appt

## 2022-11-01 NOTE — TELEPHONE ENCOUNTER
Caller: Shalini Bertrand    Relationship: Self    Best call back number:      [phone]      What is the best time to reach you:     ANY DAY AFTER 9:00 A.M.    Who are you requesting to speak with (clinical staff, provider,  specific staff member):         What was the call regarding:     PATIENT REQUESTED A CALL BACK FROM THE  SINCE SHE WOULD LIKE TO HAVE A TRANSFER OF CARE FROM DR BRIDGETT MARTINEZ BACK TO DR VILLAR    Do you require a callback:     YES

## 2022-11-07 ENCOUNTER — OFFICE VISIT (OUTPATIENT)
Dept: FAMILY MEDICINE CLINIC | Facility: CLINIC | Age: 82
End: 2022-11-07

## 2022-11-07 VITALS
DIASTOLIC BLOOD PRESSURE: 70 MMHG | SYSTOLIC BLOOD PRESSURE: 130 MMHG | BODY MASS INDEX: 20.13 KG/M2 | WEIGHT: 109.4 LBS | HEIGHT: 62 IN | OXYGEN SATURATION: 96 % | HEART RATE: 95 BPM

## 2022-11-07 DIAGNOSIS — Z23 NEED FOR VACCINATION: ICD-10-CM

## 2022-11-07 DIAGNOSIS — R82.81 PYURIA: ICD-10-CM

## 2022-11-07 DIAGNOSIS — G47.9 SLEEP DISTURBANCE: ICD-10-CM

## 2022-11-07 DIAGNOSIS — F41.1 GAD (GENERALIZED ANXIETY DISORDER): ICD-10-CM

## 2022-11-07 DIAGNOSIS — R73.03 PREDIABETES: Primary | ICD-10-CM

## 2022-11-07 DIAGNOSIS — M25.512 ACUTE PAIN OF LEFT SHOULDER: ICD-10-CM

## 2022-11-07 LAB
BILIRUB BLD-MCNC: NEGATIVE MG/DL
CLARITY, POC: ABNORMAL
COLOR UR: YELLOW
EXPIRATION DATE: ABNORMAL
GLUCOSE UR STRIP-MCNC: NEGATIVE MG/DL
KETONES UR QL: ABNORMAL
LEUKOCYTE EST, POC: ABNORMAL
Lab: ABNORMAL
NITRITE UR-MCNC: NEGATIVE MG/ML
PH UR: 5 [PH] (ref 5–8)
PROT UR STRIP-MCNC: ABNORMAL MG/DL
RBC # UR STRIP: NEGATIVE /UL
SP GR UR: 1.03 (ref 1–1.03)
UROBILINOGEN UR QL: NORMAL

## 2022-11-07 PROCEDURE — G0008 ADMIN INFLUENZA VIRUS VAC: HCPCS | Performed by: FAMILY MEDICINE

## 2022-11-07 PROCEDURE — 81003 URINALYSIS AUTO W/O SCOPE: CPT | Performed by: FAMILY MEDICINE

## 2022-11-07 PROCEDURE — 87086 URINE CULTURE/COLONY COUNT: CPT | Performed by: FAMILY MEDICINE

## 2022-11-07 PROCEDURE — 99214 OFFICE O/P EST MOD 30 MIN: CPT | Performed by: FAMILY MEDICINE

## 2022-11-07 PROCEDURE — 90677 PCV20 VACCINE IM: CPT | Performed by: FAMILY MEDICINE

## 2022-11-07 PROCEDURE — 90662 IIV NO PRSV INCREASED AG IM: CPT | Performed by: FAMILY MEDICINE

## 2022-11-07 RX ORDER — DUPILUMAB 300 MG/2ML
INJECTION, SOLUTION SUBCUTANEOUS
COMMUNITY
Start: 2022-11-01

## 2022-11-07 NOTE — PROGRESS NOTES
"Chief Complaint  Anxiety and skin issue (Has seen dermatologist was recommended to start dupixent she just received it and has not started it yet)    Subjective        Shalini Bertrand presents to [facility]  History of Present Illness     She is a \"nervous wreck.\" She was prescribed fluoxetine.     She has pain in her left arm, flares up with raising shoulder. Ache down down her arm. No chest pain. If she is about to pass gas she has more arm pain. No chest pain on exertion or palpitations. Arm pain for 2-3 weeks.     She has a skin condition, she has big holes and scab in her skin. Picking and bleeding. She saw dermatology 6 months ago. She was prescribed dupixent, she hasn't started it yet. Next appointment with dermatology 11/16/22.     She has cut down on snacks like chips, but she eats sugar.    She has lost weight, she was up to 114 lbs and lost around 5 lbs.    She is concerned about kidney problems and has too much yeast in her body. She would like a urinalysis.     She uses trazodone for sleep.       Objective   Vital Signs:  /70   Pulse 95   Ht 157.5 cm (62.01\")   Wt 49.6 kg (109 lb 6.4 oz)   SpO2 96%   BMI 20.00 kg/m²   Estimated body mass index is 20 kg/m² as calculated from the following:    Height as of this encounter: 157.5 cm (62.01\").    Weight as of this encounter: 49.6 kg (109 lb 6.4 oz).    BMI is within normal parameters. No other follow-up for BMI required.      Physical Exam  Vitals reviewed.   Constitutional:       General: She is not in acute distress.     Appearance: She is not ill-appearing.   HENT:      Right Ear: Tympanic membrane and ear canal normal.      Left Ear: Tympanic membrane and ear canal normal.   Cardiovascular:      Rate and Rhythm: Normal rate and regular rhythm.   Pulmonary:      Effort: Pulmonary effort is normal.      Breath sounds: Normal breath sounds.   Musculoskeletal:      Left shoulder: No deformity or bony tenderness. " Decreased range of motion.   Skin:     Comments: Excoriations extensively on body.   Neurological:      Mental Status: She is alert.        Result Review :  The following data was reviewed by: Henrietta Bass MD on 11/07/2022:  TSH    TSH 6/23/22   TSH 2.370           A1C Last 3 Results    HGBA1C Last 3 Results 6/23/22   Hemoglobin A1C 5.90 (A)   (A) Abnormal value                       Immunization History   Administered Date(s) Administered   • COVID-19 (MODERNA) BIVALENT BOOSTER 18+YRS 09/26/2022   • COVID-19 (MODERNA) BOOSTER 11/08/2021   • Fluzone High-Dose 65+yrs 10/05/2021   • Tdap 01/01/2007     Results for orders placed or performed in visit on 11/07/22   POC Urinalysis Dipstick, Automated    Specimen: Urine   Result Value Ref Range    Color Yellow Yellow, Straw, Dark Yellow, [place]    Clarity, UA Slightly Cloudy (A) Clear    Specific Gravity  1.030 1.005 - 1.030    pH, Urine 5.0 5.0 - 8.0    Leukocytes Moderate (2+) (A) Negative    Nitrite, UA Negative Negative    Protein, POC Trace (A) Negative mg/dL    Glucose, UA Negative Negative mg/dL    Ketones, UA Trace (A) Negative    Urobilinogen, UA Normal Normal, 0.2 E.U./dL    Bilirubin Negative Negative    Blood, UA Negative Negative    Lot Number [id number]     Expiration Date 10/21/2023      Assessment and Plan   Diagnoses and all orders for this visit:    1. Prediabetes (Primary)  -     POC Urinalysis Dipstick, Automated  Reviewed labs as ordered by previous provider in June.  Needs low sugar diet.  2. Acute pain of left shoulder  -     XR Shoulder 2+ View Left; Future  New.  Suspect arthritis or rotator cuff.  X-ray ordered today.  Recommend physical therapy but patient prefers exercises at home and handout provided.  3. Sleep disturbance  Continue trazodone.    4. Pyuria  -     Urine Culture - Urine, Urine, Clean Catch; Future  Asymptomatic urinalysis today with white blood cells.  Check urine culture and defer antibiotics unless positive.  5. Need  for vaccination  -     Fluzone High-Dose 65+yrs  -     Pneumococcal Conjugate Vaccine 20-Valent All  6. BHASKAR (generalized anxiety disorder)  She does not have fluoxetine on her active medication list.           Follow Up   No follow-ups on file.  Patient was given instructions and counseling regarding her condition or for health maintenance advice. Please see specific information pulled into the AVS if appropriate.     Electronically signed by Henrietta Bass MD, 11/07/22, 2:37 PM EST.

## 2022-11-09 LAB — BACTERIA SPEC AEROBE CULT: NORMAL

## 2022-11-16 DIAGNOSIS — G47.9 SLEEP DISTURBANCE: ICD-10-CM

## 2022-11-16 NOTE — TELEPHONE ENCOUNTER
Caller: Shalini Bertrand    Relationship: Self    Best call back number: [phone]    Requested Prescriptions:   Requested Prescriptions     Pending Prescriptions Disp Refills   • traZODone (DESYREL) 50 MG tablet 90 tablet 3     Sig: Take 0.5-1 tablets by mouth Every Night.        Pharmacy where request should be sent: [facility] [address] [place] [address] [phone] [facility] [phone] FX     Additional details provided by patient: PATIENT STATES THAT SHE WAS SUPPOSED TO HAVE A NEW PRESCRIPTION  FOR HER NERVES. PATIENT IS REQUESTING A CALL WHEN HAS REQUEST HAVE BEEN SENT.  PLEASE ADVISE    Does the patient have less than a 3 day supply:  [x] Yes  [] No    Yun Quezada Rep   11/16/22 10:35 EST

## 2022-11-17 RX ORDER — TRAZODONE HYDROCHLORIDE 50 MG/1
25-50 TABLET ORAL NIGHTLY
Qty: 90 TABLET | Refills: 0 | Status: SHIPPED | OUTPATIENT
Start: 2022-11-17

## 2022-11-17 NOTE — TELEPHONE ENCOUNTER
PATIENT IS FOLLOWING UP ABOUT THIS REQUEST. SHE WAS INFORMED ON THE 48 HOURS AND SAID SHE WILL CALL TO CHECK IT ON Friday AROUND NOON. PLEASE MAKE SURE TO CALL HER WHEN THIS MEDICATION IS FILLED.     PHONE: [phone]

## 2024-09-19 ENCOUNTER — HOSPITAL ENCOUNTER (OUTPATIENT)
Facility: HOSPITAL | Age: 84
Setting detail: OBSERVATION
Discharge: HOME | End: 2024-09-20
Attending: EMERGENCY MEDICINE | Admitting: STUDENT IN AN ORGANIZED HEALTH CARE EDUCATION/TRAINING PROGRAM
Payer: MEDICARE

## 2024-09-19 DIAGNOSIS — R03.0 ELEVATED BLOOD PRESSURE READING WITHOUT DIAGNOSIS OF HYPERTENSION: ICD-10-CM

## 2024-09-19 DIAGNOSIS — R41.841 COGNITIVE COMMUNICATION DEFICIT: ICD-10-CM

## 2024-09-19 DIAGNOSIS — R47.01 EXPRESSIVE APHASIA: ICD-10-CM

## 2024-09-19 DIAGNOSIS — G45.9 TIA (TRANSIENT ISCHEMIC ATTACK): Primary | ICD-10-CM

## 2024-09-19 PROCEDURE — G0378 HOSPITAL OBSERVATION PER HR: HCPCS

## 2024-09-19 PROCEDURE — 93005 ELECTROCARDIOGRAM TRACING: CPT | Performed by: EMERGENCY MEDICINE

## 2024-09-19 PROCEDURE — 99285 EMERGENCY DEPT VISIT HI MDM: CPT

## 2024-09-19 PROCEDURE — 36415 COLL VENOUS BLD VENIPUNCTURE: CPT

## 2024-09-19 RX ORDER — SODIUM CHLORIDE 0.9 % (FLUSH) 0.9 %
10 SYRINGE (ML) INJECTION AS NEEDED
Status: DISCONTINUED | OUTPATIENT
Start: 2024-09-19 | End: 2024-09-20 | Stop reason: HOSPADM

## 2024-09-20 ENCOUNTER — APPOINTMENT (OUTPATIENT)
Dept: CT IMAGING | Facility: HOSPITAL | Age: 84
End: 2024-09-20
Payer: MEDICARE

## 2024-09-20 ENCOUNTER — APPOINTMENT (OUTPATIENT)
Dept: GENERAL RADIOLOGY | Facility: HOSPITAL | Age: 84
End: 2024-09-20
Payer: MEDICARE

## 2024-09-20 ENCOUNTER — APPOINTMENT (OUTPATIENT)
Dept: MRI IMAGING | Facility: HOSPITAL | Age: 84
End: 2024-09-20
Payer: MEDICARE

## 2024-09-20 ENCOUNTER — APPOINTMENT (OUTPATIENT)
Dept: CARDIOLOGY | Facility: HOSPITAL | Age: 84
End: 2024-09-20
Payer: MEDICARE

## 2024-09-20 ENCOUNTER — READMISSION MANAGEMENT (OUTPATIENT)
Dept: CALL CENTER | Facility: HOSPITAL | Age: 84
End: 2024-09-20
Payer: MEDICARE

## 2024-09-20 VITALS
WEIGHT: 106.26 LBS | SYSTOLIC BLOOD PRESSURE: 157 MMHG | DIASTOLIC BLOOD PRESSURE: 94 MMHG | RESPIRATION RATE: 16 BRPM | OXYGEN SATURATION: 92 % | HEART RATE: 75 BPM | HEIGHT: 61 IN | TEMPERATURE: 98.7 F | BODY MASS INDEX: 20.06 KG/M2

## 2024-09-20 PROBLEM — R29.90 NEUROLOGICAL SYMPTOMS: Status: ACTIVE | Noted: 2024-09-20

## 2024-09-20 LAB
ALBUMIN SERPL-MCNC: 4.1 G/DL (ref 3.5–5.2)
ALBUMIN/GLOB SERPL: 1.6 G/DL
ALP SERPL-CCNC: 82 U/L (ref 39–117)
ALT SERPL W P-5'-P-CCNC: 8 U/L (ref 1–33)
ALT SERPL W P-5'-P-CCNC: 8 U/L (ref 1–33)
AMPHET+METHAMPHET UR QL: NEGATIVE
AMPHETAMINES UR QL: NEGATIVE
ANION GAP SERPL CALCULATED.3IONS-SCNC: 10 MMOL/L (ref 5–15)
ANION GAP SERPL CALCULATED.3IONS-SCNC: 12 MMOL/L (ref 5–15)
ANION GAP SERPL CALCULATED.3IONS-SCNC: 9 MMOL/L (ref 5–15)
APTT PPP: 29.9 SECONDS (ref 22–39)
AST SERPL-CCNC: 17 U/L (ref 1–32)
AST SERPL-CCNC: 18 U/L (ref 1–32)
BACTERIA UR QL AUTO: ABNORMAL /HPF
BARBITURATES UR QL SCN: NEGATIVE
BASOPHILS # BLD AUTO: 0.07 10*3/MM3 (ref 0–0.2)
BASOPHILS # BLD AUTO: 0.07 10*3/MM3 (ref 0–0.2)
BASOPHILS NFR BLD AUTO: 1 % (ref 0–1.5)
BASOPHILS NFR BLD AUTO: 1.1 % (ref 0–1.5)
BENZODIAZ UR QL SCN: NEGATIVE
BH CV ECHO MEAS - AO MAX PG: 3.5 MMHG
BH CV ECHO MEAS - AO MEAN PG: 2 MMHG
BH CV ECHO MEAS - AO ROOT DIAM: 2.4 CM
BH CV ECHO MEAS - AO V2 MAX: 93.9 CM/SEC
BH CV ECHO MEAS - AO V2 VTI: 19.9 CM
BH CV ECHO MEAS - AVA(I,D): 2.9 CM2
BH CV ECHO MEAS - EDV(CUBED): 54.9 ML
BH CV ECHO MEAS - EDV(MOD-SP2): 31.5 ML
BH CV ECHO MEAS - EDV(MOD-SP4): 47.2 ML
BH CV ECHO MEAS - EF(MOD-BP): 70.3 %
BH CV ECHO MEAS - EF(MOD-SP2): 71.4 %
BH CV ECHO MEAS - EF(MOD-SP4): 64.4 %
BH CV ECHO MEAS - ESV(CUBED): 12.2 ML
BH CV ECHO MEAS - ESV(MOD-SP2): 9 ML
BH CV ECHO MEAS - ESV(MOD-SP4): 16.8 ML
BH CV ECHO MEAS - FS: 39.5 %
BH CV ECHO MEAS - IVS/LVPW: 1 CM
BH CV ECHO MEAS - IVSD: 0.9 CM
BH CV ECHO MEAS - LA DIMENSION: 3 CM
BH CV ECHO MEAS - LAT PEAK E' VEL: 9.1 CM/SEC
BH CV ECHO MEAS - LV MASS(C)D: 101.1 GRAMS
BH CV ECHO MEAS - LV MAX PG: 3.1 MMHG
BH CV ECHO MEAS - LV MEAN PG: 2 MMHG
BH CV ECHO MEAS - LV V1 MAX: 88.6 CM/SEC
BH CV ECHO MEAS - LV V1 VTI: 18.5 CM
BH CV ECHO MEAS - LVIDD: 3.8 CM
BH CV ECHO MEAS - LVIDS: 2.3 CM
BH CV ECHO MEAS - LVOT AREA: 3.1 CM2
BH CV ECHO MEAS - LVOT DIAM: 2 CM
BH CV ECHO MEAS - LVPWD: 0.9 CM
BH CV ECHO MEAS - MED PEAK E' VEL: 7.3 CM/SEC
BH CV ECHO MEAS - MV A MAX VEL: 120 CM/SEC
BH CV ECHO MEAS - MV DEC SLOPE: 374 CM/SEC2
BH CV ECHO MEAS - MV DEC TIME: 0.28 SEC
BH CV ECHO MEAS - MV E MAX VEL: 80.4 CM/SEC
BH CV ECHO MEAS - MV E/A: 0.67
BH CV ECHO MEAS - MV MAX PG: 8 MMHG
BH CV ECHO MEAS - MV MEAN PG: 3 MMHG
BH CV ECHO MEAS - MV P1/2T: 78.3 MSEC
BH CV ECHO MEAS - MV V2 VTI: 29.6 CM
BH CV ECHO MEAS - MVA(P1/2T): 2.8 CM2
BH CV ECHO MEAS - MVA(VTI): 1.96 CM2
BH CV ECHO MEAS - PA ACC TIME: 0.14 SEC
BH CV ECHO MEAS - RAP SYSTOLE: 3 MMHG
BH CV ECHO MEAS - RVSP: 16.1 MMHG
BH CV ECHO MEAS - SV(LVOT): 58.1 ML
BH CV ECHO MEAS - SV(MOD-SP2): 22.5 ML
BH CV ECHO MEAS - SV(MOD-SP4): 30.4 ML
BH CV ECHO MEAS - TAPSE (>1.6): 1.81 CM
BH CV ECHO MEAS - TR MAX PG: 13.1 MMHG
BH CV ECHO MEAS - TR MAX VEL: 181 CM/SEC
BH CV ECHO MEASUREMENTS AVERAGE E/E' RATIO: 9.8
BH CV XLRA - RV BASE: 2.9 CM
BH CV XLRA - RV LENGTH: 5.6 CM
BH CV XLRA - RV MID: 2.5 CM
BH CV XLRA - TDI S': 15 CM/SEC
BILIRUB SERPL-MCNC: 0.4 MG/DL (ref 0–1.2)
BILIRUB UR QL STRIP: NEGATIVE
BUN BLDA-MCNC: 17 MG/DL (ref 8–26)
BUN SERPL-MCNC: 13 MG/DL (ref 8–23)
BUN SERPL-MCNC: 17 MG/DL (ref 8–23)
BUN SERPL-MCNC: 17 MG/DL (ref 8–23)
BUN/CREAT SERPL: 13.3 (ref 7–25)
BUN/CREAT SERPL: 13.7 (ref 7–25)
BUN/CREAT SERPL: 14.9 (ref 7–25)
BUPRENORPHINE SERPL-MCNC: NEGATIVE NG/ML
CA-I BLDA-SCNC: 1.23 MMOL/L (ref 1.2–1.32)
CALCIUM SPEC-SCNC: 9.2 MG/DL (ref 8.6–10.5)
CALCIUM SPEC-SCNC: 9.3 MG/DL (ref 8.6–10.5)
CALCIUM SPEC-SCNC: 9.5 MG/DL (ref 8.6–10.5)
CANNABINOIDS SERPL QL: NEGATIVE
CHLORIDE BLDA-SCNC: 103 MMOL/L (ref 98–109)
CHLORIDE SERPL-SCNC: 104 MMOL/L (ref 98–107)
CHLORIDE SERPL-SCNC: 104 MMOL/L (ref 98–107)
CHLORIDE SERPL-SCNC: 105 MMOL/L (ref 98–107)
CHOLEST SERPL-MCNC: 273 MG/DL (ref 0–200)
CLARITY UR: CLEAR
CO2 BLDA-SCNC: 24 MMOL/L (ref 24–29)
CO2 SERPL-SCNC: 24 MMOL/L (ref 22–29)
CO2 SERPL-SCNC: 26 MMOL/L (ref 22–29)
CO2 SERPL-SCNC: 27 MMOL/L (ref 22–29)
COCAINE UR QL: NEGATIVE
COLOR UR: YELLOW
CREAT BLDA-MCNC: 1.4 MG/DL (ref 0.6–1.3)
CREAT BLDA-MCNC: 1.4 MG/DL (ref 0.6–1.3)
CREAT SERPL-MCNC: 0.95 MG/DL (ref 0.57–1)
CREAT SERPL-MCNC: 1.14 MG/DL (ref 0.57–1)
CREAT SERPL-MCNC: 1.28 MG/DL (ref 0.57–1)
CREAT UR-MCNC: 15.1 MG/DL
DEPRECATED RDW RBC AUTO: 46.5 FL (ref 37–54)
DEPRECATED RDW RBC AUTO: 46.5 FL (ref 37–54)
EGFRCR SERPLBLD CKD-EPI 2021: 36.7 ML/MIN/1.73
EGFRCR SERPLBLD CKD-EPI 2021: 40.9 ML/MIN/1.73
EGFRCR SERPLBLD CKD-EPI 2021: 47 ML/MIN/1.73
EGFRCR SERPLBLD CKD-EPI 2021: 58.5 ML/MIN/1.73
EOSINOPHIL # BLD AUTO: 0.12 10*3/MM3 (ref 0–0.4)
EOSINOPHIL # BLD AUTO: 0.16 10*3/MM3 (ref 0–0.4)
EOSINOPHIL NFR BLD AUTO: 1.9 % (ref 0.3–6.2)
EOSINOPHIL NFR BLD AUTO: 2.3 % (ref 0.3–6.2)
EOSINOPHIL SPEC QL MICRO: 0 % EOS/100 CELLS (ref 0–0)
ERYTHROCYTE [DISTWIDTH] IN BLOOD BY AUTOMATED COUNT: 13.5 % (ref 12.3–15.4)
ERYTHROCYTE [DISTWIDTH] IN BLOOD BY AUTOMATED COUNT: 13.6 % (ref 12.3–15.4)
ETHANOL BLD-MCNC: <10 MG/DL (ref 0–10)
FENTANYL UR-MCNC: NEGATIVE NG/ML
GLOBULIN UR ELPH-MCNC: 2.5 GM/DL
GLUCOSE BLDC GLUCOMTR-MCNC: 83 MG/DL (ref 70–130)
GLUCOSE BLDC GLUCOMTR-MCNC: 98 MG/DL (ref 70–130)
GLUCOSE SERPL-MCNC: 94 MG/DL (ref 65–99)
GLUCOSE SERPL-MCNC: 97 MG/DL (ref 65–99)
GLUCOSE SERPL-MCNC: 99 MG/DL (ref 65–99)
GLUCOSE UR STRIP-MCNC: NEGATIVE MG/DL
HBA1C MFR BLD: 5.7 % (ref 4.8–5.6)
HCT VFR BLD AUTO: 42.3 % (ref 34–46.6)
HCT VFR BLD AUTO: 42.4 % (ref 34–46.6)
HCT VFR BLDA CALC: 40 % (ref 38–51)
HDLC SERPL-MCNC: 67 MG/DL (ref 40–60)
HGB BLD-MCNC: 13.4 G/DL (ref 12–15.9)
HGB BLD-MCNC: 13.4 G/DL (ref 12–15.9)
HGB BLDA-MCNC: 13.6 G/DL (ref 12–17)
HGB UR QL STRIP.AUTO: NEGATIVE
HOLD SPECIMEN: NORMAL
HYALINE CASTS UR QL AUTO: ABNORMAL /LPF
IMM GRANULOCYTES # BLD AUTO: 0.01 10*3/MM3 (ref 0–0.05)
IMM GRANULOCYTES # BLD AUTO: 0.01 10*3/MM3 (ref 0–0.05)
IMM GRANULOCYTES NFR BLD AUTO: 0.1 % (ref 0–0.5)
IMM GRANULOCYTES NFR BLD AUTO: 0.2 % (ref 0–0.5)
INR PPP: 1.3 (ref 0.8–1.2)
KETONES UR QL STRIP: NEGATIVE
LDLC SERPL CALC-MCNC: 192 MG/DL (ref 0–100)
LDLC/HDLC SERPL: 2.82 {RATIO}
LEFT ATRIUM VOLUME INDEX: 8.9 ML/M2
LEUKOCYTE ESTERASE UR QL STRIP.AUTO: ABNORMAL
LYMPHOCYTES # BLD AUTO: 2.02 10*3/MM3 (ref 0.7–3.1)
LYMPHOCYTES # BLD AUTO: 2.35 10*3/MM3 (ref 0.7–3.1)
LYMPHOCYTES NFR BLD AUTO: 32.4 % (ref 19.6–45.3)
LYMPHOCYTES NFR BLD AUTO: 33.8 % (ref 19.6–45.3)
MAGNESIUM SERPL-MCNC: 2.3 MG/DL (ref 1.6–2.4)
MCH RBC QN AUTO: 29.5 PG (ref 26.6–33)
MCH RBC QN AUTO: 29.5 PG (ref 26.6–33)
MCHC RBC AUTO-ENTMCNC: 31.6 G/DL (ref 31.5–35.7)
MCHC RBC AUTO-ENTMCNC: 31.7 G/DL (ref 31.5–35.7)
MCV RBC AUTO: 93.2 FL (ref 79–97)
MCV RBC AUTO: 93.4 FL (ref 79–97)
METHADONE UR QL SCN: NEGATIVE
MONOCYTES # BLD AUTO: 0.54 10*3/MM3 (ref 0.1–0.9)
MONOCYTES # BLD AUTO: 0.64 10*3/MM3 (ref 0.1–0.9)
MONOCYTES NFR BLD AUTO: 8.7 % (ref 5–12)
MONOCYTES NFR BLD AUTO: 9.2 % (ref 5–12)
NEUTROPHILS NFR BLD AUTO: 3.48 10*3/MM3 (ref 1.7–7)
NEUTROPHILS NFR BLD AUTO: 3.73 10*3/MM3 (ref 1.7–7)
NEUTROPHILS NFR BLD AUTO: 53.6 % (ref 42.7–76)
NEUTROPHILS NFR BLD AUTO: 55.7 % (ref 42.7–76)
NITRITE UR QL STRIP: NEGATIVE
NRBC BLD AUTO-RTO: 0 /100 WBC (ref 0–0.2)
NRBC BLD AUTO-RTO: 0 /100 WBC (ref 0–0.2)
OPIATES UR QL: NEGATIVE
OXYCODONE UR QL SCN: NEGATIVE
PCP UR QL SCN: NEGATIVE
PH UR STRIP.AUTO: 7.5 [PH] (ref 5–8)
PLATELET # BLD AUTO: 280 10*3/MM3 (ref 140–450)
PLATELET # BLD AUTO: 289 10*3/MM3 (ref 140–450)
PMV BLD AUTO: 10 FL (ref 6–12)
PMV BLD AUTO: 10 FL (ref 6–12)
POTASSIUM BLDA-SCNC: 3.6 MMOL/L (ref 3.5–4.9)
POTASSIUM SERPL-SCNC: 3.8 MMOL/L (ref 3.5–5.2)
POTASSIUM SERPL-SCNC: 3.8 MMOL/L (ref 3.5–5.2)
POTASSIUM SERPL-SCNC: 4.4 MMOL/L (ref 3.5–5.2)
PROT SERPL-MCNC: 6.6 G/DL (ref 6–8.5)
PROT UR QL STRIP: NEGATIVE
PROTHROMBIN TIME: 15.7 SECONDS (ref 12.8–15.2)
QT INTERVAL: 418 MS
QTC INTERVAL: 493 MS
RBC # BLD AUTO: 4.54 10*6/MM3 (ref 3.77–5.28)
RBC # BLD AUTO: 4.54 10*6/MM3 (ref 3.77–5.28)
RBC # UR STRIP: ABNORMAL /HPF
REF LAB TEST METHOD: ABNORMAL
SODIUM BLD-SCNC: 140 MMOL/L (ref 138–146)
SODIUM SERPL-SCNC: 140 MMOL/L (ref 136–145)
SODIUM SERPL-SCNC: 140 MMOL/L (ref 136–145)
SODIUM SERPL-SCNC: 141 MMOL/L (ref 136–145)
SODIUM UR-SCNC: 106 MMOL/L
SP GR UR STRIP: 1.02 (ref 1–1.03)
SQUAMOUS #/AREA URNS HPF: ABNORMAL /HPF
TRICYCLICS UR QL SCN: NEGATIVE
TRIGL SERPL-MCNC: 85 MG/DL (ref 0–150)
TROPONIN T SERPL HS-MCNC: 10 NG/L
TSH SERPL DL<=0.05 MIU/L-ACNC: 2.29 UIU/ML (ref 0.27–4.2)
UROBILINOGEN UR QL STRIP: ABNORMAL
VLDLC SERPL-MCNC: 14 MG/DL (ref 5–40)
WBC # UR STRIP: ABNORMAL /HPF
WBC NRBC COR # BLD AUTO: 6.24 10*3/MM3 (ref 3.4–10.8)
WBC NRBC COR # BLD AUTO: 6.96 10*3/MM3 (ref 3.4–10.8)
WHOLE BLOOD HOLD COAG: NORMAL
WHOLE BLOOD HOLD SPECIMEN: NORMAL

## 2024-09-20 PROCEDURE — 71045 X-RAY EXAM CHEST 1 VIEW: CPT

## 2024-09-20 PROCEDURE — 70498 CT ANGIOGRAPHY NECK: CPT

## 2024-09-20 PROCEDURE — 84484 ASSAY OF TROPONIN QUANT: CPT | Performed by: EMERGENCY MEDICINE

## 2024-09-20 PROCEDURE — G0378 HOSPITAL OBSERVATION PER HR: HCPCS

## 2024-09-20 PROCEDURE — 93306 TTE W/DOPPLER COMPLETE: CPT | Performed by: INTERNAL MEDICINE

## 2024-09-20 PROCEDURE — 83735 ASSAY OF MAGNESIUM: CPT | Performed by: NURSE PRACTITIONER

## 2024-09-20 PROCEDURE — 99214 OFFICE O/P EST MOD 30 MIN: CPT

## 2024-09-20 PROCEDURE — 70496 CT ANGIOGRAPHY HEAD: CPT

## 2024-09-20 PROCEDURE — 80053 COMPREHEN METABOLIC PANEL: CPT | Performed by: EMERGENCY MEDICINE

## 2024-09-20 PROCEDURE — 85610 PROTHROMBIN TIME: CPT

## 2024-09-20 PROCEDURE — 99233 SBSQ HOSP IP/OBS HIGH 50: CPT | Performed by: STUDENT IN AN ORGANIZED HEALTH CARE EDUCATION/TRAINING PROGRAM

## 2024-09-20 PROCEDURE — 80047 BASIC METABLC PNL IONIZED CA: CPT

## 2024-09-20 PROCEDURE — 82570 ASSAY OF URINE CREATININE: CPT | Performed by: NURSE PRACTITIONER

## 2024-09-20 PROCEDURE — 84443 ASSAY THYROID STIM HORMONE: CPT | Performed by: NURSE PRACTITIONER

## 2024-09-20 PROCEDURE — 85025 COMPLETE CBC W/AUTO DIFF WBC: CPT | Performed by: EMERGENCY MEDICINE

## 2024-09-20 PROCEDURE — 70551 MRI BRAIN STEM W/O DYE: CPT

## 2024-09-20 PROCEDURE — 93306 TTE W/DOPPLER COMPLETE: CPT

## 2024-09-20 PROCEDURE — 82948 REAGENT STRIP/BLOOD GLUCOSE: CPT

## 2024-09-20 PROCEDURE — 80307 DRUG TEST PRSMV CHEM ANLYZR: CPT | Performed by: EMERGENCY MEDICINE

## 2024-09-20 PROCEDURE — 0042T HC CT CEREBRAL PERFUSION W/WO CONTRAST: CPT

## 2024-09-20 PROCEDURE — 85730 THROMBOPLASTIN TIME PARTIAL: CPT | Performed by: EMERGENCY MEDICINE

## 2024-09-20 PROCEDURE — 70450 CT HEAD/BRAIN W/O DYE: CPT

## 2024-09-20 PROCEDURE — 85014 HEMATOCRIT: CPT

## 2024-09-20 PROCEDURE — 92523 SPEECH SOUND LANG COMPREHEN: CPT

## 2024-09-20 PROCEDURE — 25810000003 SODIUM CHLORIDE 0.9 % SOLUTION: Performed by: NURSE PRACTITIONER

## 2024-09-20 PROCEDURE — 80061 LIPID PANEL: CPT

## 2024-09-20 PROCEDURE — 84300 ASSAY OF URINE SODIUM: CPT | Performed by: NURSE PRACTITIONER

## 2024-09-20 PROCEDURE — 25510000001 IOPAMIDOL PER 1 ML: Performed by: EMERGENCY MEDICINE

## 2024-09-20 PROCEDURE — 83036 HEMOGLOBIN GLYCOSYLATED A1C: CPT

## 2024-09-20 PROCEDURE — 87205 SMEAR GRAM STAIN: CPT | Performed by: NURSE PRACTITIONER

## 2024-09-20 PROCEDURE — 82077 ASSAY SPEC XCP UR&BREATH IA: CPT | Performed by: EMERGENCY MEDICINE

## 2024-09-20 PROCEDURE — 81001 URINALYSIS AUTO W/SCOPE: CPT | Performed by: EMERGENCY MEDICINE

## 2024-09-20 PROCEDURE — 85025 COMPLETE CBC W/AUTO DIFF WBC: CPT | Performed by: NURSE PRACTITIONER

## 2024-09-20 PROCEDURE — 99235 HOSP IP/OBS SAME DATE MOD 70: CPT | Performed by: STUDENT IN AN ORGANIZED HEALTH CARE EDUCATION/TRAINING PROGRAM

## 2024-09-20 RX ORDER — SODIUM CHLORIDE 9 MG/ML
40 INJECTION, SOLUTION INTRAVENOUS AS NEEDED
Status: DISCONTINUED | OUTPATIENT
Start: 2024-09-20 | End: 2024-09-20 | Stop reason: HOSPADM

## 2024-09-20 RX ORDER — ACETAMINOPHEN 325 MG/1
650 TABLET ORAL EVERY 4 HOURS PRN
Status: DISCONTINUED | OUTPATIENT
Start: 2024-09-20 | End: 2024-09-20 | Stop reason: HOSPADM

## 2024-09-20 RX ORDER — AMLODIPINE BESYLATE 5 MG/1
5 TABLET ORAL
Status: DISCONTINUED | OUTPATIENT
Start: 2024-09-21 | End: 2024-09-20 | Stop reason: HOSPADM

## 2024-09-20 RX ORDER — SODIUM CHLORIDE 9 MG/ML
75 INJECTION, SOLUTION INTRAVENOUS CONTINUOUS
Status: DISCONTINUED | OUTPATIENT
Start: 2024-09-20 | End: 2024-09-20 | Stop reason: HOSPADM

## 2024-09-20 RX ORDER — BISACODYL 5 MG/1
5 TABLET, DELAYED RELEASE ORAL DAILY PRN
Status: DISCONTINUED | OUTPATIENT
Start: 2024-09-20 | End: 2024-09-20 | Stop reason: HOSPADM

## 2024-09-20 RX ORDER — IOPAMIDOL 755 MG/ML
120 INJECTION, SOLUTION INTRAVASCULAR
Status: COMPLETED | OUTPATIENT
Start: 2024-09-20 | End: 2024-09-20

## 2024-09-20 RX ORDER — ATORVASTATIN CALCIUM 40 MG/1
80 TABLET, FILM COATED ORAL NIGHTLY
Status: DISCONTINUED | OUTPATIENT
Start: 2024-09-20 | End: 2024-09-20 | Stop reason: HOSPADM

## 2024-09-20 RX ORDER — SODIUM CHLORIDE 0.9 % (FLUSH) 0.9 %
10 SYRINGE (ML) INJECTION AS NEEDED
Status: DISCONTINUED | OUTPATIENT
Start: 2024-09-20 | End: 2024-09-20 | Stop reason: HOSPADM

## 2024-09-20 RX ORDER — ATORVASTATIN CALCIUM 80 MG/1
80 TABLET, FILM COATED ORAL NIGHTLY
Qty: 30 TABLET | Refills: 1 | Status: SHIPPED | OUTPATIENT
Start: 2024-09-20

## 2024-09-20 RX ORDER — ASPIRIN 81 MG/1
81 TABLET ORAL DAILY
Qty: 30 TABLET | Refills: 1 | Status: SHIPPED | OUTPATIENT
Start: 2024-09-20

## 2024-09-20 RX ORDER — POLYETHYLENE GLYCOL 3350 17 G/17G
17 POWDER, FOR SOLUTION ORAL DAILY PRN
Status: DISCONTINUED | OUTPATIENT
Start: 2024-09-20 | End: 2024-09-20 | Stop reason: HOSPADM

## 2024-09-20 RX ORDER — SODIUM CHLORIDE 0.9 % (FLUSH) 0.9 %
10 SYRINGE (ML) INJECTION EVERY 12 HOURS SCHEDULED
Status: DISCONTINUED | OUTPATIENT
Start: 2024-09-20 | End: 2024-09-20 | Stop reason: HOSPADM

## 2024-09-20 RX ORDER — AMOXICILLIN 250 MG
2 CAPSULE ORAL 2 TIMES DAILY PRN
Status: DISCONTINUED | OUTPATIENT
Start: 2024-09-20 | End: 2024-09-20 | Stop reason: HOSPADM

## 2024-09-20 RX ORDER — CLOPIDOGREL BISULFATE 75 MG/1
75 TABLET ORAL DAILY
Qty: 20 TABLET | Refills: 0 | Status: SHIPPED | OUTPATIENT
Start: 2024-09-21

## 2024-09-20 RX ORDER — CLOPIDOGREL BISULFATE 75 MG/1
75 TABLET ORAL DAILY
Status: DISCONTINUED | OUTPATIENT
Start: 2024-09-20 | End: 2024-09-20 | Stop reason: HOSPADM

## 2024-09-20 RX ORDER — AMLODIPINE BESYLATE 5 MG/1
5 TABLET ORAL
Qty: 30 TABLET | Refills: 1 | Status: SHIPPED | OUTPATIENT
Start: 2024-09-21

## 2024-09-20 RX ORDER — ASPIRIN 300 MG/1
300 SUPPOSITORY RECTAL DAILY
Status: DISCONTINUED | OUTPATIENT
Start: 2024-09-20 | End: 2024-09-20 | Stop reason: HOSPADM

## 2024-09-20 RX ORDER — ASPIRIN 81 MG/1
81 TABLET, CHEWABLE ORAL DAILY
Status: DISCONTINUED | OUTPATIENT
Start: 2024-09-20 | End: 2024-09-20 | Stop reason: HOSPADM

## 2024-09-20 RX ORDER — BISACODYL 10 MG
10 SUPPOSITORY, RECTAL RECTAL DAILY PRN
Status: DISCONTINUED | OUTPATIENT
Start: 2024-09-20 | End: 2024-09-20 | Stop reason: HOSPADM

## 2024-09-20 RX ADMIN — CLOPIDOGREL BISULFATE 75 MG: 75 TABLET ORAL at 15:02

## 2024-09-20 RX ADMIN — ASPIRIN 81 MG 81 MG: 81 TABLET ORAL at 01:21

## 2024-09-20 RX ADMIN — SODIUM CHLORIDE 75 ML/HR: 9 INJECTION, SOLUTION INTRAVENOUS at 03:14

## 2024-09-20 RX ADMIN — Medication 10 ML: at 09:32

## 2024-09-20 RX ADMIN — IOPAMIDOL 120 ML: 755 INJECTION, SOLUTION INTRAVENOUS at 00:19

## 2024-09-23 ENCOUNTER — TRANSITIONAL CARE MANAGEMENT TELEPHONE ENCOUNTER (OUTPATIENT)
Dept: CALL CENTER | Facility: HOSPITAL | Age: 84
End: 2024-09-23
Payer: MEDICARE

## 2024-09-24 ENCOUNTER — TRANSITIONAL CARE MANAGEMENT TELEPHONE ENCOUNTER (OUTPATIENT)
Dept: CALL CENTER | Facility: HOSPITAL | Age: 84
End: 2024-09-24
Payer: MEDICARE

## 2024-10-06 LAB
QT INTERVAL: 418 MS
QTC INTERVAL: 493 MS